# Patient Record
Sex: FEMALE | Race: WHITE | Employment: UNEMPLOYED | URBAN - METROPOLITAN AREA
[De-identification: names, ages, dates, MRNs, and addresses within clinical notes are randomized per-mention and may not be internally consistent; named-entity substitution may affect disease eponyms.]

---

## 2017-10-11 ENCOUNTER — HOSPITAL ENCOUNTER (INPATIENT)
Age: 27
LOS: 2 days | Discharge: HOME | DRG: 885 | End: 2017-10-13
Attending: EMERGENCY MEDICINE | Admitting: PSYCHIATRY & NEUROLOGY
Payer: MEDICARE

## 2017-10-11 DIAGNOSIS — R44.0 AUDITORY HALLUCINATION: Primary | ICD-10-CM

## 2017-10-11 PROBLEM — F29 PSYCHOSIS (HCC): Status: ACTIVE | Noted: 2017-10-11

## 2017-10-11 LAB
ALBUMIN SERPL-MCNC: 4.1 G/DL (ref 3.5–5)
ALBUMIN/GLOB SERPL: 1.2 {RATIO} (ref 1.1–2.2)
ALP SERPL-CCNC: 48 U/L (ref 45–117)
ALT SERPL-CCNC: 20 U/L (ref 12–78)
AMPHET UR QL SCN: NEGATIVE
ANION GAP SERPL CALC-SCNC: 8 MMOL/L (ref 5–15)
APAP SERPL-MCNC: <2 UG/ML (ref 10–30)
APPEARANCE UR: CLEAR
AST SERPL-CCNC: 15 U/L (ref 15–37)
BACTERIA URNS QL MICRO: NEGATIVE /HPF
BARBITURATES UR QL SCN: NEGATIVE
BASOPHILS # BLD: 0 K/UL (ref 0–0.1)
BASOPHILS NFR BLD: 0 % (ref 0–1)
BENZODIAZ UR QL: NEGATIVE
BILIRUB SERPL-MCNC: 0.7 MG/DL (ref 0.2–1)
BILIRUB UR QL: NEGATIVE
BUN SERPL-MCNC: 7 MG/DL (ref 6–20)
BUN/CREAT SERPL: 8 (ref 12–20)
CALCIUM SERPL-MCNC: 9.1 MG/DL (ref 8.5–10.1)
CANNABINOIDS UR QL SCN: NEGATIVE
CHLORIDE SERPL-SCNC: 105 MMOL/L (ref 97–108)
CO2 SERPL-SCNC: 26 MMOL/L (ref 21–32)
COCAINE UR QL SCN: NEGATIVE
COLOR UR: NORMAL
CREAT SERPL-MCNC: 0.9 MG/DL (ref 0.55–1.02)
DRUG SCRN COMMENT,DRGCM: NORMAL
EOSINOPHIL # BLD: 0.2 K/UL (ref 0–0.4)
EOSINOPHIL NFR BLD: 3 % (ref 0–7)
EPITH CASTS URNS QL MICRO: NORMAL /LPF
ERYTHROCYTE [DISTWIDTH] IN BLOOD BY AUTOMATED COUNT: 12.8 % (ref 11.5–14.5)
ETHANOL SERPL-MCNC: <10 MG/DL
GLOBULIN SER CALC-MCNC: 3.5 G/DL (ref 2–4)
GLUCOSE SERPL-MCNC: 90 MG/DL (ref 65–100)
GLUCOSE UR STRIP.AUTO-MCNC: NEGATIVE MG/DL
HCG UR QL: NEGATIVE
HCT VFR BLD AUTO: 36.8 % (ref 35–47)
HGB BLD-MCNC: 12.6 G/DL (ref 11.5–16)
HGB UR QL STRIP: NEGATIVE
HYALINE CASTS URNS QL MICRO: NORMAL /LPF (ref 0–5)
KETONES UR QL STRIP.AUTO: NEGATIVE MG/DL
LEUKOCYTE ESTERASE UR QL STRIP.AUTO: NEGATIVE
LYMPHOCYTES # BLD: 1.6 K/UL (ref 0.8–3.5)
LYMPHOCYTES NFR BLD: 22 % (ref 12–49)
MCH RBC QN AUTO: 30.6 PG (ref 26–34)
MCHC RBC AUTO-ENTMCNC: 34.2 G/DL (ref 30–36.5)
MCV RBC AUTO: 89.3 FL (ref 80–99)
METHADONE UR QL: NEGATIVE
MONOCYTES # BLD: 0.4 K/UL (ref 0–1)
MONOCYTES NFR BLD: 6 % (ref 5–13)
NEUTS SEG # BLD: 5 K/UL (ref 1.8–8)
NEUTS SEG NFR BLD: 69 % (ref 32–75)
NITRITE UR QL STRIP.AUTO: NEGATIVE
OPIATES UR QL: NEGATIVE
PCP UR QL: NEGATIVE
PH UR STRIP: 7.5 [PH] (ref 5–8)
PLATELET # BLD AUTO: 186 K/UL (ref 150–400)
POTASSIUM SERPL-SCNC: 3.8 MMOL/L (ref 3.5–5.1)
PROT SERPL-MCNC: 7.6 G/DL (ref 6.4–8.2)
PROT UR STRIP-MCNC: NEGATIVE MG/DL
RBC # BLD AUTO: 4.12 M/UL (ref 3.8–5.2)
RBC #/AREA URNS HPF: NORMAL /HPF (ref 0–5)
SALICYLATES SERPL-MCNC: <1.7 MG/DL (ref 2.8–20)
SODIUM SERPL-SCNC: 139 MMOL/L (ref 136–145)
SP GR UR REFRACTOMETRY: 1.01 (ref 1–1.03)
TSH SERPL DL<=0.05 MIU/L-ACNC: 1.39 UIU/ML (ref 0.36–3.74)
UR CULT HOLD, URHOLD: NORMAL
UROBILINOGEN UR QL STRIP.AUTO: 0.2 EU/DL (ref 0.2–1)
WBC # BLD AUTO: 7.3 K/UL (ref 3.6–11)
WBC URNS QL MICRO: NORMAL /HPF (ref 0–4)

## 2017-10-11 PROCEDURE — 36415 COLL VENOUS BLD VENIPUNCTURE: CPT | Performed by: EMERGENCY MEDICINE

## 2017-10-11 PROCEDURE — 80307 DRUG TEST PRSMV CHEM ANLYZR: CPT | Performed by: EMERGENCY MEDICINE

## 2017-10-11 PROCEDURE — 80053 COMPREHEN METABOLIC PANEL: CPT | Performed by: EMERGENCY MEDICINE

## 2017-10-11 PROCEDURE — 74011250637 HC RX REV CODE- 250/637: Performed by: PSYCHIATRY & NEUROLOGY

## 2017-10-11 PROCEDURE — 84443 ASSAY THYROID STIM HORMONE: CPT | Performed by: PSYCHIATRY & NEUROLOGY

## 2017-10-11 PROCEDURE — 90791 PSYCH DIAGNOSTIC EVALUATION: CPT

## 2017-10-11 PROCEDURE — 99285 EMERGENCY DEPT VISIT HI MDM: CPT

## 2017-10-11 PROCEDURE — 65220000003 HC RM SEMIPRIVATE PSYCH

## 2017-10-11 PROCEDURE — 81025 URINE PREGNANCY TEST: CPT

## 2017-10-11 PROCEDURE — 81001 URINALYSIS AUTO W/SCOPE: CPT | Performed by: EMERGENCY MEDICINE

## 2017-10-11 PROCEDURE — 85025 COMPLETE CBC W/AUTO DIFF WBC: CPT | Performed by: EMERGENCY MEDICINE

## 2017-10-11 RX ORDER — HALOPERIDOL DECANOATE 50 MG/ML
50 INJECTION INTRAMUSCULAR ONCE
COMMUNITY
End: 2017-10-11

## 2017-10-11 RX ORDER — LORAZEPAM 2 MG/ML
2 INJECTION INTRAMUSCULAR
Status: DISCONTINUED | OUTPATIENT
Start: 2017-10-11 | End: 2017-10-13 | Stop reason: HOSPADM

## 2017-10-11 RX ORDER — IBUPROFEN 200 MG
1 TABLET ORAL
Status: DISCONTINUED | OUTPATIENT
Start: 2017-10-11 | End: 2017-10-13 | Stop reason: HOSPADM

## 2017-10-11 RX ORDER — ZOLPIDEM TARTRATE 10 MG/1
10 TABLET ORAL
Status: DISCONTINUED | OUTPATIENT
Start: 2017-10-11 | End: 2017-10-11 | Stop reason: DRUGHIGH

## 2017-10-11 RX ORDER — BENZTROPINE MESYLATE 1 MG/1
1 TABLET ORAL 2 TIMES DAILY
COMMUNITY
End: 2017-10-13

## 2017-10-11 RX ORDER — ZOLPIDEM TARTRATE 5 MG/1
5 TABLET ORAL
Status: DISCONTINUED | OUTPATIENT
Start: 2017-10-11 | End: 2017-10-13 | Stop reason: HOSPADM

## 2017-10-11 RX ORDER — HALOPERIDOL DECANOATE 100 MG/ML
100 INJECTION INTRAMUSCULAR
COMMUNITY
End: 2017-10-13

## 2017-10-11 RX ORDER — OLANZAPINE 5 MG/1
5 TABLET ORAL
Status: DISCONTINUED | OUTPATIENT
Start: 2017-10-11 | End: 2017-10-13 | Stop reason: HOSPADM

## 2017-10-11 RX ORDER — ADHESIVE BANDAGE
30 BANDAGE TOPICAL DAILY PRN
Status: DISCONTINUED | OUTPATIENT
Start: 2017-10-11 | End: 2017-10-13 | Stop reason: HOSPADM

## 2017-10-11 RX ORDER — LORAZEPAM 1 MG/1
1 TABLET ORAL
Status: DISCONTINUED | OUTPATIENT
Start: 2017-10-11 | End: 2017-10-13 | Stop reason: HOSPADM

## 2017-10-11 RX ORDER — BENZTROPINE MESYLATE 2 MG/1
2 TABLET ORAL
Status: DISCONTINUED | OUTPATIENT
Start: 2017-10-11 | End: 2017-10-13 | Stop reason: HOSPADM

## 2017-10-11 RX ORDER — IBUPROFEN 400 MG/1
400 TABLET ORAL
Status: DISCONTINUED | OUTPATIENT
Start: 2017-10-11 | End: 2017-10-13 | Stop reason: HOSPADM

## 2017-10-11 RX ORDER — BENZTROPINE MESYLATE 1 MG/ML
2 INJECTION INTRAMUSCULAR; INTRAVENOUS
Status: DISCONTINUED | OUTPATIENT
Start: 2017-10-11 | End: 2017-10-13 | Stop reason: HOSPADM

## 2017-10-11 RX ORDER — HYDROXYZINE PAMOATE 50 MG/1
50 CAPSULE ORAL 3 TIMES DAILY
COMMUNITY
End: 2017-10-13

## 2017-10-11 RX ORDER — ACETAMINOPHEN 325 MG/1
650 TABLET ORAL
Status: DISCONTINUED | OUTPATIENT
Start: 2017-10-11 | End: 2017-10-13 | Stop reason: HOSPADM

## 2017-10-11 RX ADMIN — LORAZEPAM 1 MG: 1 TABLET ORAL at 19:44

## 2017-10-11 RX ADMIN — BENZTROPINE MESYLATE 2 MG: 2 TABLET ORAL at 21:28

## 2017-10-11 RX ADMIN — OLANZAPINE 5 MG: 5 TABLET, FILM COATED ORAL at 19:44

## 2017-10-11 NOTE — IP AVS SNAPSHOT
[address] 
[phone] Patient: Denisa Magana MRN: [id number] :1990 You are allergic to the following No active allergies Recent Documentation Height Weight Breastfeeding? BMI OB Status Smoking Status 1.651 m 59 kg No 21.63 kg/m2 Having regular periods Never Smoker Emergency Contacts Name Discharge Info Relation Home Work Mobile None,None DECLINED CAREGIVER [4] Unknown [9] [phone] About your hospitalization You were admitted on:  2017 You last received care in the:  [address] You were discharged on:  2017 Unit phone number:  [phone] Why you were hospitalized Your primary diagnosis was:  Psychosis Providers Seen During Your Hospitalizations Provider Role Specialty Primary office phone Daniella Garcia MD Attending Provider Emergency Medicine [phone] Chato Headley MD Attending Provider Psychiatry [phone] Your Primary Care Physician (PCP) Primary Care Physician Office Phone Office Fax NONE ** None ** ** None ** Follow-up Information Follow up With Details Comments Contact Info [address] On 10/13/2017 You have a 4:20pm appointment with Dr. Jeimy Phillips. [facility] Current Discharge Medication List  
  
START taking these medications Dose & Instructions Dispensing Information Comments Morning Noon Evening Bedtime  
 hydrOXYzine HCl 50 mg tablet Commonly known as:  ATARAX Replaces:  hydrOXYzine pamoate 50 mg capsule Your next dose is: Today at 4pm  
   
 Dose:  50 mg Take 1 Tab by mouth three (3) times daily for 10 days. Indications: anxiety Quantity:  90 Tab Refills:  0 CONTINUE these medications which have CHANGED Dose & Instructions Dispensing Information Comments Morning Noon Evening Bedtime  
 haloperidol decanoate 100 mg/mL injection Commonly known as:  HALDOL DECANOATE What changed:  how much to take Notes to Patient:  You will receive this today and the next dose will be in November Dose:  150 mg  
1.5 mL by IntraMUSCular route every twenty-eight (28) days. Indications: Schizophrenia Quantity:  1.5 mL Refills:  0 CONTINUE these medications which have NOT CHANGED Dose & Instructions Dispensing Information Comments Morning Noon Evening Bedtime  
 benztropine 1 mg tablet Commonly known as:  COGENTIN Your next dose is: Today at 9pm  
   
 Dose:  1 mg Take 1 Tab by mouth two (2) times a day. Indications: extrapyramidal disease Quantity:  60 Tab Refills:  0 STOP taking these medications   
 hydrOXYzine pamoate 50 mg capsule Commonly known as:  VISTARIL Replaced by:  hydrOXYzine HCl 50 mg tablet Where to Get Your Medications Information on where to get these meds will be given to you by the nurse or doctor. ! Ask your nurse or doctor about these medications  
  benztropine 1 mg tablet  
 haloperidol decanoate 100 mg/mL injection  
 hydrOXYzine HCl 50 mg tablet Discharge Instructions DISCHARGE SUMMARY 
 
NAME:Bonnie Marques : 1990 MRN: [id number] The patient Zhang Overcast exhibits the ability to control behavior in a less restrictive environment. Patient's level of functioning is improving. No assaultive/destructive behavior has been observed for the past 24 hours. No suicidal/homicidal threat or behavior has been observed for the past 24 hours. There is no evidence of serious medication side effects. Patient has not been in physical or protective restraints for at least the past 24 hours. If weapons involved, how are they secured? No weapons involved. Is patient aware of and in agreement with discharge plan? Yes Arrangements for medication:  Prescriptions given to patient. Copy of discharge instructions to provider?:  Ayana Roth Arrangements for transportation home:  Taxi to [place] Keep all follow up appointments as scheduled, continue to take prescribed medications per physician instructions. Mental health crisis number:  773 or your local mental health crisis line number at [phone]. DISCHARGE SUMMARY from Nurse The following personal items are in your possession at time of discharge: 
 
Dental Appliances: None Visual Aid: None Home Medications: None Jewelry: None Clothing: Footwear, Pants, Shirt, Socks, Undergarments (2tee,2pant,4undies,sox,bra,boots) Other Valuables: Cell Phone, Keys, Personal toiletries, Purse, Wallet (purse,elec tbrush,pste,shmp,aditi(clst)phone,$7+,keys,id(MR)) Personal Items Sent to Safe: cc 
 
 
 
 
PATIENT INSTRUCTIONS: 
 
What to do at Home: 
Recommended activity: Activity as tolerated, If you experience any of the following symptoms thoughts of harming self, feeling overwhelmed with anxiety and increase in auditory hallucinations, please follow up with your assigned staff. . 
 
 
*  Please give a list of your current medications to your Primary Care Provider. *  Please update this list whenever your medications are discontinued, doses are 
    changed, or new medications (including over-the-counter products) are added. *  Please carry medication information at all times in case of emergency situations. These are general instructions for a healthy lifestyle: No smoking/ No tobacco products/ Avoid exposure to second hand smoke Surgeon General's Warning:  Quitting smoking now greatly reduces serious risk to your health. Obesity, smoking, and sedentary lifestyle greatly increases your risk for illness A healthy diet, regular physical exercise & weight monitoring are important for maintaining a healthy lifestyle You may be retaining fluid if you have a history of heart failure or if you experience any of the following symptoms:  Weight gain of 3 pounds or more overnight or 5 pounds in a week, increased swelling in our hands or feet or shortness of breath while lying flat in bed. Please call your doctor as soon as you notice any of these symptoms; do not wait until your next office visit. Recognize signs and symptoms of STROKE: 
 
F-face looks uneven A-arms unable to move or move unevenly S-speech slurred or non-existent T-time-call 911 as soon as signs and symptoms begin-DO NOT go Back to bed or wait to see if you get better-TIME IS BRAIN. Warning Signs of HEART ATTACK Call 911 if you have these symptoms: 
? Chest discomfort. Most heart attacks involve discomfort in the center of the chest that lasts more than a few minutes, or that goes away and comes back. It can feel like uncomfortable pressure, squeezing, fullness, or pain. ? Discomfort in other areas of the upper body. Symptoms can include pain or discomfort in one or both arms, the back, neck, jaw, or stomach. ? Shortness of breath with or without chest discomfort. ? Other signs may include breaking out in a cold sweat, nausea, or lightheadedness. Don't wait more than five minutes to call [address]! Fast action can save your life. Calling 911 is almost always the fastest way to get lifesaving treatment. Emergency Medical Services staff can begin treatment when they arrive  up to an hour sooner than if someone gets to the hospital by car. The discharge information has been reviewed with the patient. The patient verbalized understanding. Discharge medications reviewed with the patient and appropriate educational materials and side effects teaching were provided. Discharge Orders None [facility] Announcement We are excited to announce that we are making your provider's discharge notes available to you in Vudu. You will see these notes when they are completed and signed by the physician that discharged you from your recent hospital stay. If you have any questions or concerns about any information you see in Vudu, please call the Health Information Department where you were seen or reach out to your Primary Care Provider for more information about your plan of care. Introducing [facility]! Romayne Duster introduces Vudu patient portal. Now you can access parts of your medical record, email your doctor's office, and request medication refills online. 1. In your internet browser, go to [email or web address] 2. Click on the First Time User? Click Here link in the Sign In box. You will see the New Member Sign Up page. 3. Enter your Vudu Access Code exactly as it appears below. You will not need to use this code after youve completed the sign-up process. If you do not sign up before the expiration date, you must request a new code. · Vudu Access Code: [email or web address] Expires: 1/11/2018 12:35 PM 
 
4. Enter the last four digits of your Social Security Number (xxxx) and Date of Birth (mm/dd/yyyy) as indicated and click Submit. You will be taken to the next sign-up page. 5. Create a Vudu ID. This will be your Vudu login ID and cannot be changed, so think of one that is secure and easy to remember. 6. Create a Vudu password. You can change your password at any time. 7. Enter your Password Reset Question and Answer. This can be used at a later time if you forget your password. 8. Enter your e-mail address. You will receive e-mail notification when new information is available in [address] 9. Click Sign Up. You can now view and download portions of your medical record.  
10. Click the Download Summary menu link to download a portable copy of your medical information. If you have questions, please visit the Frequently Asked Questions section of the [employer] website. Remember, MyChart is NOT to be used for urgent needs. For medical emergencies, dial 911. Now available from your iPhone and Android! General Information Please provide this summary of care documentation to your next provider. Patient Signature:  ____________________________________________________________ Date:  ____________________________________________________________  
  
Tory Hero Provider Signature:  ____________________________________________________________ Date:  ____________________________________________________________

## 2017-10-11 NOTE — IP AVS SNAPSHOT
[address] 
[phone] Patient: Prem West MRN: [id number] :1990 Current Discharge Medication List  
  
START taking these medications Dose & Instructions Dispensing Information Comments Morning Noon Evening Bedtime  
 hydrOXYzine HCl 50 mg tablet Commonly known as:  ATARAX Replaces:  hydrOXYzine pamoate 50 mg capsule Your next dose is: Today at 4pm  
   
 Dose:  50 mg Take 1 Tab by mouth three (3) times daily for 10 days. Indications: anxiety Quantity:  90 Tab Refills:  0 CONTINUE these medications which have CHANGED Dose & Instructions Dispensing Information Comments Morning Noon Evening Bedtime  
 haloperidol decanoate 100 mg/mL injection Commonly known as:  HALDOL DECANOATE What changed:  how much to take Notes to Patient:  You will receive this today and the next dose will be in November Dose:  150 mg  
1.5 mL by IntraMUSCular route every twenty-eight (28) days. Indications: Schizophrenia Quantity:  1.5 mL Refills:  0 CONTINUE these medications which have NOT CHANGED Dose & Instructions Dispensing Information Comments Morning Noon Evening Bedtime  
 benztropine 1 mg tablet Commonly known as:  COGENTIN Your next dose is: Today at 9pm  
   
 Dose:  1 mg Take 1 Tab by mouth two (2) times a day. Indications: extrapyramidal disease Quantity:  60 Tab Refills:  0 STOP taking these medications   
 hydrOXYzine pamoate 50 mg capsule Commonly known as:  VISTARIL Replaced by:  hydrOXYzine HCl 50 mg tablet Where to Get Your Medications Information on where to get these meds will be given to you by the nurse or doctor. ! Ask your nurse or doctor about these medications  
  benztropine 1 mg tablet  
 haloperidol decanoate 100 mg/mL injection hydrOXYzine HCl 50 mg tablet

## 2017-10-11 NOTE — BH NOTES
Patient admitted voluntarily to General Inpatient Psychiatry, under the services of . Patient currently denies suicidal ideation. Patient currently denies homicidal ideation. Patient verbally contracts for safety. Patient admits psychotic symptoms, including auditory and tactile hallucinations. Pt denies ETOH use. Pt denies drug use.
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
## 2017-10-11 NOTE — ED TRIAGE NOTES
TRIAGE: Pt arrives via EMS from Behavioral health clinic with c/o hearing voices that are 'saying they are going to hurt me and im scared and I dont know what to do'. Pt on haldol now and its not helping.  Voices started when she was taken off of clonipine in 2015. -SI, -HI

## 2017-10-11 NOTE — ROUTINE PROCESS
TRANSFER - OUT REPORT:    Verbal report given to Poly(name) on Ashley Merchant  being transferred to (unit) for routine progression of care       Report consisted of patients Situation, Background, Assessment and   Recommendations(SBAR). Information from the following report(s) SBAR, ED Summary, Intake/Output, MAR and Recent Results was reviewed with the receiving nurse. Lines:       Opportunity for questions and clarification was provided.       Patient transported with:   Tech and HPD

## 2017-10-11 NOTE — BH NOTES
PRN Medication Documentation    Specific patient behavior that led to need for PRN medication: anxiety and hearing voices  Staff interventions attempted prior to PRN being given: coping skills  PRN medication given: ativan and zyprexa  Patient response/effectiveness of PRN medication: will reassess    9:30 PM  PRN Medication Documentation    Specific patient behavior that led to need for PRN medication: side effect symptoms  Staff interventions attempted prior to PRN being given:   PRN medication given: cogentin  Patient response/effectiveness of PRN medication:

## 2017-10-11 NOTE — ED PROVIDER NOTES
HPI Comments: 27 y.o. female with past medical history significant for anxiety who presents via EMS from behavioral health clinic with chief complaint of auditory hallucinations. Patient complains of auditory hallucinations that have been on and off since 2015 when she was taken off Klonopin. Patient reports voices have been gradually worsening over the past 2 months since she came to [address] [place] [address]. Patient states the voices do not tell her to harm herself or others but they are threatening to harm her and she does not feel safe. Patient was seen at [facility] 3 days ago, saw psychiatry, and her Vistaril was increased. Patient has an appointment with [facility] in two days. Patient states she is compliant with her haloperidol but \"it isn't working anymore. \"  She states she has been on haloperidol for the past 6-12 months. Patient states she was diagnosed with anxiety disorder in 2013 while she was in the [employer] and was started on Raymundo Denver 13. Patient denies fever, chills, vomiting, diarrhea, appetite change, recent illness. There are no other acute medical concerns at this time. Denies chance of pregnancy. Social hx: Nonsmoker  PCP: No primary care provider on file. Note written by Hamlet Talbert, as dictated by Janes Anderson MD 2:25 PM      The history is provided by the patient. No past medical history on file. No past surgical history on file. No family history on file. Social History     Social History    Marital status: N/A     Spouse name: N/A    Number of children: N/A    Years of education: N/A     Occupational History    Not on file. Social History Main Topics    Smoking status: Not on file    Smokeless tobacco: Not on file    Alcohol use Not on file    Drug use: Not on file    Sexual activity: Not on file     Other Topics Concern    Not on file     Social History Narrative         ALLERGIES: Review of patient's allergies indicates not on file.     Review of Systems Constitutional: Negative for appetite change, chills and fever. HENT: Negative for congestion. Respiratory: Negative for cough. Gastrointestinal: Negative for constipation, diarrhea, nausea and vomiting. Psychiatric/Behavioral: Positive for hallucinations (auditory). Negative for suicidal ideas. All other systems reviewed and are negative. Vitals:    10/11/17 1428   BP: 108/72   Pulse: 86   Resp: 22   Temp: 98.4 °F (36.9 °C)   SpO2: 97%   Weight: 59 kg (130 lb)   Height: 5' 5\" (1.651 m)            Physical Exam   Constitutional: She is oriented to person, place, and time. She appears well-developed and well-nourished. Anxious, NAD, AxOx4, speaking in complete sentences     HENT:   Head: Normocephalic and atraumatic. Nose: Nose normal.   Mouth/Throat: Oropharynx is clear and moist.   Eyes: Conjunctivae and EOM are normal. Pupils are equal, round, and reactive to light. Right eye exhibits no discharge. Left eye exhibits no discharge. No scleral icterus. Neck: Normal range of motion. Neck supple. No JVD present. No tracheal deviation present. Cardiovascular: Normal rate, regular rhythm, normal heart sounds and intact distal pulses. Exam reveals no gallop and no friction rub. No murmur heard. Pulmonary/Chest: Effort normal and breath sounds normal. No respiratory distress. She has no wheezes. She has no rales. She exhibits no tenderness. Abdominal: Soft. Bowel sounds are normal. There is no tenderness. There is no rebound and no guarding. nttp     Genitourinary: No vaginal discharge found. Genitourinary Comments: Pt denies urinary/ vaginal complaints   Musculoskeletal: Normal range of motion. She exhibits no edema, tenderness or deformity. Neurological: She is alert and oriented to person, place, and time. She has normal reflexes. No cranial nerve deficit. She exhibits normal muscle tone. Coordination normal.   Skin: Skin is warm and dry. No rash noted. No erythema. No pallor. Psychiatric: Her mood appears anxious. Her affect is not angry, not blunt, not labile and not inappropriate. Her speech is not rapid and/or pressured, not delayed, not tangential and not slurred. She is actively hallucinating. She is not agitated, not aggressive, not hyperactive, not slowed, not withdrawn and not combative. Thought content is not paranoid and not delusional. Cognition and memory are not impaired. She does not express impulsivity or inappropriate judgment. She exhibits a depressed mood. She expresses no homicidal and no suicidal ideation. She expresses no suicidal plans and no homicidal plans. She is communicative. She exhibits normal recent memory and normal remote memory. She is attentive. Nursing note and vitals reviewed.        [facility]  ED Course       Procedures Pt w/ , 'the haloperidol isnt working'; will evaluate/ check labs/ have evaluated by [facility]; Pt agrees w/ these plans;     4:02 PM  Pt is medically cleared to be evaluated by psychiatry;

## 2017-10-11 NOTE — BH NOTES
TRANSFER - IN REPORT:    Verbal report received from Marci (RN) on Dana Doyle  being received from ED  for routine progression of care      Report consisted of patients Situation, Background, Assessment and   Recommendations(SBAR). Information from the following report(s) SBAR was reviewed with the receiving nurse. Opportunity for questions and clarification was provided. Assessment completed upon patients arrival to unit and care assumed.

## 2017-10-12 LAB
CHOLEST SERPL-MCNC: 124 MG/DL
GLUCOSE P FAST SERPL-MCNC: 96 MG/DL (ref 65–100)
HDLC SERPL-MCNC: 54 MG/DL
HDLC SERPL: 2.3 {RATIO} (ref 0–5)
LDLC SERPL CALC-MCNC: 53 MG/DL (ref 0–100)
LIPID PROFILE,FLP: NORMAL
TRIGL SERPL-MCNC: 85 MG/DL (ref ?–150)
VLDLC SERPL CALC-MCNC: 17 MG/DL

## 2017-10-12 PROCEDURE — 36415 COLL VENOUS BLD VENIPUNCTURE: CPT | Performed by: PSYCHIATRY & NEUROLOGY

## 2017-10-12 PROCEDURE — 74011250637 HC RX REV CODE- 250/637: Performed by: PSYCHIATRY & NEUROLOGY

## 2017-10-12 PROCEDURE — 82947 ASSAY GLUCOSE BLOOD QUANT: CPT | Performed by: PSYCHIATRY & NEUROLOGY

## 2017-10-12 PROCEDURE — 80061 LIPID PANEL: CPT | Performed by: PSYCHIATRY & NEUROLOGY

## 2017-10-12 PROCEDURE — 65220000003 HC RM SEMIPRIVATE PSYCH

## 2017-10-12 RX ORDER — HYDROXYZINE 50 MG/1
50 TABLET, FILM COATED ORAL 3 TIMES DAILY
Status: DISCONTINUED | OUTPATIENT
Start: 2017-10-12 | End: 2017-10-13 | Stop reason: HOSPADM

## 2017-10-12 RX ORDER — BENZTROPINE MESYLATE 1 MG/1
1 TABLET ORAL 2 TIMES DAILY
Status: DISCONTINUED | OUTPATIENT
Start: 2017-10-12 | End: 2017-10-13 | Stop reason: HOSPADM

## 2017-10-12 RX ORDER — HALOPERIDOL DECANOATE 100 MG/ML
150 INJECTION INTRAMUSCULAR
Status: DISCONTINUED | OUTPATIENT
Start: 2017-10-12 | End: 2017-10-13 | Stop reason: HOSPADM

## 2017-10-12 RX ADMIN — LORAZEPAM 1 MG: 1 TABLET ORAL at 22:21

## 2017-10-12 RX ADMIN — HYDROXYZINE HYDROCHLORIDE 50 MG: 50 TABLET, FILM COATED ORAL at 17:39

## 2017-10-12 RX ADMIN — OLANZAPINE 5 MG: 5 TABLET, FILM COATED ORAL at 22:12

## 2017-10-12 RX ADMIN — BENZTROPINE MESYLATE 1 MG: 1 TABLET ORAL at 17:40

## 2017-10-12 RX ADMIN — HYDROXYZINE HYDROCHLORIDE 50 MG: 50 TABLET, FILM COATED ORAL at 21:36

## 2017-10-12 NOTE — BH NOTES
PSYCHIATRIC PROGRESS NOTE         Patient Name  Dany La   Date of Birth 1990   [facility] [id number]   Medical Record Number  [id number]      Age  27 y.o. PCP None   Admit date:  10/11/2017    Room Number  727/02  @ [facility]   Date of Service  10/12/2017          PSYCHOTHERAPY SESSION NOTE:  Length of psychotherapy session: 45 minutes    Main condition/diagnosis/issues treated during session today, 10/12/2017 : psychotic symptoms reported by patient, coping skills      employed Cognitive Behavioral therapy techniques, Reality-Oriented psychotherapy, as well as supportive psychotherapy in regards to various ongoing psychosocial stressors, including the following: pre-admission and current problems; occupational issues; academic issues; medical issues; and stress of hospitalization. Interpersonal relationship issues and psychodynamic conflicts explored. Attempts made to alleviate maladaptive patterns. We, also, worked on issues of denial & effects of substance dependency/use     Overall, patient is not progressing    Treatment Plan Update (reviewed an updated 10/12/2017) : I will modify psychotherapy tx plan by implementing more stress management strategies, building upon cognitive behavioral techniques, increasing coping skills, as well as shoring up psychological defenses). An extended energy and skill set was needed to engage pt in psychotherapy due to some of the following: resistiveness, complexity, negativity, confrontational nature, hostile behaviors, and/or severe abnormalities in thought processes/psychosis resulting in the loss of expressive/receptive language communication skills. E & M PROGRESS NOTE:         HISTORY       CC:  \"AH\"  HISTORY OF PRESENT ILLNESS/INTERVAL HISTORY:  (reviewed/updated 10/12/2017). per initial evaluation:     Dany La presents/reports/evidences the following emotional symptoms today, 10/12/2017:psychosis.  The above symptoms have been present for years . These symptoms are of severe severity. The symptoms are constant  in nature. Additional symptomatology and features include anxiety. 10//11/17- Stable, medication compliant, no acute changes      SIDE EFFECTS: (reviewed/updated 10/12/2017)  None reported or admitted to. No noted toxicity with use of Depakote/Tegretol/lithium/Clozaril/TCAs   ALLERGIES:(reviewed/updated 10/12/2017)  No Known Allergies   MEDICATIONS PRIOR TO ADMISSION:(reviewed/updated 10/12/2017)  Prescriptions Prior to Admission   Medication Sig    benztropine (COGENTIN) 1 mg tablet Take 1 mg by mouth two (2) times a day.  hydrOXYzine pamoate (VISTARIL) 50 mg capsule Take 50 mg by mouth three (3) times daily.  haloperidol decanoate (HALDOL DECANOATE) 100 mg/mL injection 100 mg by IntraMUSCular route every twenty-eight (28) days. PAST MEDICAL HISTORY: Past medical history from the initial psychiatric evaluation has been reviewed (reviewed/updated 10/12/2017) with no additional updates (I asked patient and no additional past medical history provided). Past Medical History:   Diagnosis Date    Anxiety disorder     Psychosis    History reviewed. No pertinent surgical history. SOCIAL HISTORY: Social history from the initial psychiatric evaluation has been reviewed (reviewed/updated 10/12/2017) with no additional updates (I asked patient and no additional social history provided). Social History     Social History    Marital status: SINGLE     Spouse name: N/A    Number of children: N/A    Years of education: N/A     Occupational History    Not on file. Social History Main Topics    Smoking status: Never Smoker    Smokeless tobacco: Never Used    Alcohol use Yes    Drug use: No    Sexual activity: No     Other Topics Concern    Not on file     Social History Narrative    27year old  female admitted for worsening AH. Pt is on 100 mg of haldol dec.  Pt is in college graduate school. She is single, unemployed and has a twin. She has had over 10 psychiatric admissions since 2013. FAMILY HISTORY: Family history from the initial psychiatric evaluation has been reviewed (reviewed/updated 10/12/2017) with no additional updates (I asked patient and no additional family history provided). History reviewed. No pertinent family history. REVIEW OF SYSTEMS: (reviewed/updated 10/12/2017)  Appetite:no change from normal   Sleep: no change   All other Review of Systems: Psychological ROS: positive for - behavioral disorder  Respiratory ROS: no cough, shortness of breath, or wheezing  Cardiovascular ROS: no chest pain or dyspnea on exertion         [facility] (MSE):    MSE FINDINGS ARE WITHIN NORMAL LIMITS (WNL) UNLESS OTHERWISE STATED BELOW. ( ALL OF THE BELOW CATEGORIES OF THE MSE HAVE BEEN REVIEWED (reviewed 10/12/2017) AND UPDATED AS DEEMED APPROPRIATE )  General Presentation age appropriate, cooperative   Orientation oriented to time, place and person   Vital Signs  See below (reviewed 10/12/2017); Vital Signs (BP, Pulse, & Temp) are within normal limits if not listed below.    Gait and Station Stable/steady, no ataxia   Musculoskeletal System No extrapyramidal symptoms (EPS); no abnormal muscular movements or Tardive Dyskinesia (TD); muscle strength and tone are within normal limits   Language No aphasia or dysarthria   Speech:  non-pressured   Thought Processes logical; normal rate of thoughts; fair abstract reasoning/computation   Thought Associations goal directed   Thought Content not internally preoccupied   Suicidal Ideations none   Homicidal Ideations none   Mood:  stable    Affect:  mood-congruent   Memory recent  fair   Memory remote:  fair   Concentration/Attention:  hypervigilance   Fund of Knowledge average   Insight:  poor   Reliability fair   Judgment:  limited          VITALS:     Patient Vitals for the past 24 hrs:   Temp Pulse Resp BP SpO2   10/12/17 0740 98 °F (36.7 °C) 87 16 105/72 98 %   10/11/17 1923 98 °F (36.7 °C) 64 18 106/69 99 %   10/11/17 1745 97.9 °F (36.6 °C) 99 16 115/77 99 %     Wt Readings from Last 3 Encounters:   10/11/17 59 kg (130 lb)     Temp Readings from Last 3 Encounters:   10/12/17 98 °F (36.7 °C)     BP Readings from Last 3 Encounters:   10/12/17 105/72     Pulse Readings from Last 3 Encounters:   10/12/17 87            DATA     LABORATORY DATA:(reviewed/updated 10/12/2017)  Recent Results (from the past 24 hour(s))   GLUCOSE, FASTING    Collection Time: 10/12/17  7:00 AM   Result Value Ref Range    Glucose 96 65 - 100 MG/DL   LIPID PANEL    Collection Time: 10/12/17  7:00 AM   Result Value Ref Range    LIPID PROFILE          Cholesterol, total 124 <200 MG/DL    Triglyceride 85 <150 MG/DL    HDL Cholesterol 54 MG/DL    LDL, calculated 53 0 - 100 MG/DL    VLDL, calculated 17 MG/DL    CHOL/HDL Ratio 2.3 0 - 5.0       No results found for: VALF2, VALAC, VALP, VALPR, DS6, CRBAM, CRBAMP, CARB2, XCRBAM  No results found for: LITHM   RADIOLOGY REPORTS:(reviewed/updated 10/12/2017)  No results found.        MEDICATIONS     ALL MEDICATIONS:   Current Facility-Administered Medications   Medication Dose Route Frequency    haloperidol decanoate (HALDOL DECANOATE) 100 mg/mL injection 150 mg  150 mg IntraMUSCular Q28D    benztropine (COGENTIN) tablet 1 mg  1 mg Oral BID    hydrOXYzine HCl (ATARAX) tablet 50 mg  50 mg Oral TID    ziprasidone (GEODON) 20 mg in sterile water (preservative free) 1 mL injection  20 mg IntraMUSCular BID PRN    OLANZapine (ZyPREXA) tablet 5 mg  5 mg Oral Q6H PRN    benztropine (COGENTIN) tablet 2 mg  2 mg Oral BID PRN    benztropine (COGENTIN) injection 2 mg  2 mg IntraMUSCular BID PRN    LORazepam (ATIVAN) injection 2 mg  2 mg IntraMUSCular Q4H PRN    LORazepam (ATIVAN) tablet 1 mg  1 mg Oral Q4H PRN    acetaminophen (TYLENOL) tablet 650 mg  650 mg Oral Q4H PRN    ibuprofen (MOTRIN) tablet 400 mg 400 mg Oral Q8H PRN    magnesium hydroxide (MILK OF MAGNESIA) 400 mg/5 mL oral suspension 30 mL  30 mL Oral DAILY PRN    nicotine (NICODERM CQ) 21 mg/24 hr patch 1 Patch  1 Patch TransDERmal DAILY PRN    zolpidem (AMBIEN) tablet 5 mg  5 mg Oral QHS PRN      SCHEDULED MEDICATIONS:   Current Facility-Administered Medications   Medication Dose Route Frequency    haloperidol decanoate (HALDOL DECANOATE) 100 mg/mL injection 150 mg  150 mg IntraMUSCular Q28D    benztropine (COGENTIN) tablet 1 mg  1 mg Oral BID    hydrOXYzine HCl (ATARAX) tablet 50 mg  50 mg Oral TID          ASSESSMENT & PLAN     DIAGNOSES REQUIRING ACTIVE TREATMENT AND MONITORING: (reviewed/updated 10/12/2017)  Patient Active Hospital Problem List:   Psychosis (10/11/2017)    Assessment: reported auditory hallucinations with no evidence of disorganized thinking or RTIS (due likely to current dosing of haldol dec)    Plan: increase Haldol dec                In summary, Bassam Bruce, is a 27 y.o.  female who presents with a severe exacerbation of the principal diagnosis of Psychosis  Patient's condition is worsening/not improving/not stable . Patient requires continued inpatient hospitalization for further stabilization, safety monitoring and medication management. I will continue to coordinate the provision of individual, milieu, occupational, group, and substance abuse therapies to address target symptoms/diagnoses as deemed appropriate for the individual patient. A coordinated, multidisplinary treatment team round was conducted with the patient (this team consists of the nurse, psychiatric unit pharmcist,  and writer). Complete current electronic health record for patient has been reviewed today including consultant notes, ancillary staff notes, nurses and psychiatric tech notes. Suicide risk assessment completed and patient deemed to be of low risk for suicide at this time.      The following regarding medications was addressed during rounds with patient:   the risks and benefits of the proposed medication. The patient was given the opportunity to ask questions. Informed consent given to the use of the above medications. Will continue to adjust psychiatric and non-psychiatric medications (see above \"medication\" section and orders section for details) as deemed appropriate & based upon diagnoses and response to treatment. I will continue to order blood tests/labs and diagnostic tests as deemed appropriate and review results as they become available (see orders for details and above listed lab/test results). I will order psychiatric records from previous [facility] hospitals to further elucidate the nature of patient's psychopathology and review once available. I will gather additional collateral information from friends, family and o/p treatment team to further elucidate the nature of patient's psychopathology and baselline level of psychiatric functioning. I certify that this patient's inpatient psychiatric hospital services furnished since the previous certification were, and continue to be, required for treatment that could reasonably be expected to improve the patient's condition, or for diagnostic study, and that the patient continues to need, on a daily basis, active treatment furnished directly by or requiring the supervision of inpatient psychiatric facility personnel. In addition, the hospital records show that services furnished were intensive treatment services, admission or related services, or equivalent services.     EXPECTED DISCHARGE DATE/DAY: TBD     DISPOSITION: Home       Signed By:   Chris Pratt MD  10/12/2017
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
## 2017-10-12 NOTE — BH NOTES
At 1436:    Patient was back and forth about taking the Haldol Deconate. Patient refused to take the med stating, \"I am hearing voices I can not do this right now\" and left the med window. At 1450:    Patient states, \"I want to take the Haldol now\" RN advised for patient to speak to the charge nurse. RN advised that patient was too indecisive for RN to give the IM at this time.

## 2017-10-12 NOTE — BH NOTES
GROUP THERAPY PROGRESS NOTE    Milagros Deshpande participated in a Process Group with a focus identifying feelings, planning for the rest of the day, and reviewing DBT skills for managing emotional distress. Group time: 65 minutes. Personal goal for participation: To increase the capacity to improve ones mood, structure, and coping capacity. Goal orientation: The patient will be able to identify their feelings, develop a plan for structuring their day, and begin to appreciate the possibility of successfully managing distress and other forms of intense emotions. Group therapy participation: With prompting, this patient participated in the group. Therapeutic interventions reviewed and discussed: The group members were asked to introduce themselves to each other and to see if they could identify an emotion they are having and/or let the group know what they want to focus on for the day as they continue to make discharge plans. The group members also reviewed five suggested areas of DBT options when experiencing intense emotions: distraction, improve the moment, self-soothe, pros and cons, and radical acceptance. They were asked to take turns reading from the handout and discussing its contents. At the end of group the patients were provided a summary of the topics covered. Impression of participation: The patient sat through the group except for the time she was called out of the room to meet with her treatment team. She listened and seemed to be tracking the conversation of her peers. She said she had come to the hospital to have her medication adjusted, that she had been put on a medication that did not agree with her on an outpatient basis. The patient expressed no current SI/HI and displayed no overt psychotic symptoms in this group. Her affect was depressed and anxious and her mood matched her affect. This was the patient's first process group with the undersigned.

## 2017-10-12 NOTE — BH NOTES
GROUP THERAPY PROGRESS NOTE    Nely Marr is participating in reflection group. Group time: 25 minutes    Personal goal for participation: PT goal was to get help today and she came to ER. Goal orientation: personal    Group therapy participation: active    Therapeutic interventions reviewed and discussed: Unite rules and guidelines. 10 tips on how to be more greatfull.      Impression of participation: active

## 2017-10-12 NOTE — INTERDISCIPLINARY ROUNDS
Behavioral Health Interdisciplinary Rounds     Patient Name: Denisa Magana  Age: 27 y.o. Room/Bed:  727/02  Primary Diagnosis: <principal problem not specified>   Admission Status: Voluntary     Readmission within 30 days: no  Power of  in place: no  Patient requires a blocked bed: no          Reason for blocked bed: N/A    VTE Prophylaxis: Not indicated  Flu vaccine given : no   Mobility needs/Fall risk: no    Nutritional Plan: no  Consults:          Labs/Testing due today?: yes    Sleep hours: 6 hrs       Participation in Care/Groups: Yes  Medication Compliant?: Yes  PRNS (last 24 hours): Antipsychotic (PO) and Antianxiety    Restraints (last 24 hours):  no  Substance Abuse:  no  CIWA (range last 24 hours):  COWS (range last 24 hours):   Alcohol screening (AUDIT) completed -  AUDIT Score: 1  If applicable, date SBIRT discussed in treatment team AND documented: N/A  Tobacco - patient is a smoker: no   Date tobacco education completed by RN: n/a  24 hour chart check complete: yes     Patient goal(s) for today: attend all groups  Treatment team focus/goals: psychosocial  Progress note: Patient presents as guarded. Denies PTSD symptoms.      LOS:  1  Expected LOS: TBD    Financial concerns/prescription coverage: [employer] Medicare  Date of last family contact: None    Family requesting physician contact today: No  Discharge plan: Return home when stable for discharge  Guns in the home: No      Outpatient provider(s): [facility] (FAUSTO Ramírez; Dr. Jeimy Phillips)    Participating treatment team members: Denisa Magana, GINA Mckeon; Dr. Mary Banuelos MD; Jese Houston, BARBIE; Cristino Tenorio, DeniseD

## 2017-10-12 NOTE — BH NOTES
INITIAL PSYCHIATRIC EVALUATION            IDENTIFICATION:    Patient Name  Javier Mcdaniel   Date of Birth 1990   [facility] [id number]   Medical Record Number  [id number]      Age  27 y.o. PCP None   Admit date:  10/11/2017    Room Number  727/02  @ [facility]   Date of Service  10/12/2017            HISTORY         REASON FOR HOSPITALIZATION:  CC: \"AH and tactile hallucinations \". Pt admitted under a voluntary basis for severe psychosis  proving to be an imminent danger to self and an inability to care for self. HISTORY OF PRESENT ILLNESS:    The patient, Javier Mcdaniel, is a 27 y.o. WHITE [place]  female with a past psychiatric history significant for a a diagnosis of schizoaffective disorder , who presents at this time with complaints of (and/or evidence of) the following emotional symptoms: hallucinations. Additional symptomatology include anxiety. The above symptoms have been present for 4 days . These symptoms are of severe severity. These symptoms are constant  in nature. The patient's condition has been precipitated by subtherapeutic haldol dose and psychosocial stressors (graduate school  ). Patient's condition made worse by treatment noncompliance. UDS: negative; BAL=0. ALLERGIES: No Known Allergies   MEDICATIONS PRIOR TO ADMISSION:   Prescriptions Prior to Admission   Medication Sig    benztropine (COGENTIN) 1 mg tablet Take 1 mg by mouth two (2) times a day.  hydrOXYzine pamoate (VISTARIL) 50 mg capsule Take 50 mg by mouth three (3) times daily.  haloperidol decanoate (HALDOL DECANOATE) 100 mg/mL injection 100 mg by IntraMUSCular route every twenty-eight (28) days. PAST MEDICAL HISTORY:   Past Medical History:   Diagnosis Date    Anxiety disorder     Psychosis    History reviewed. No pertinent surgical history.    SOCIAL HISTORY:    Social History     Social History    Marital status: SINGLE     Spouse name: N/A    Number of children: N/A    Years of education: N/A     Occupational History    Not on file. Social History Main Topics    Smoking status: Never Smoker    Smokeless tobacco: Never Used    Alcohol use Yes    Drug use: No    Sexual activity: No     Other Topics Concern    Not on file     Social History Narrative    27year old  female admitted for worsening AH. Pt is on 100 mg of haldol dec. Pt is in college graduate school. She is single, unemployed and has a twin. She has had over 10 psychiatric admissions since 2013. FAMILY HISTORY:    History reviewed. No pertinent family history. REVIEW OF SYSTEMS:   Psychological ROS: positive for - behavioral disorder  Respiratory ROS: no cough, shortness of breath, or wheezing  Cardiovascular ROS: no chest pain or dyspnea on exertion  Pertinent items are noted in the History of Present Illness. All other Systems reviewed and are considered negative. MENTAL STATUS EXAM & VITALS     MENTAL STATUS EXAM (MSE):    MSE FINDINGS ARE WITHIN NORMAL LIMITS (WNL) UNLESS OTHERWISE STATED BELOW. ( ALL OF THE BELOW CATEGORIES OF THE MSE HAVE BEEN REVIEWED (reviewed 10/12/2017) AND UPDATED AS DEEMED APPROPRIATE )  General Presentation age appropriate, cooperative   Orientation oriented to time, place and person   Vital Signs  See below (reviewed 10/12/2017); Vital Signs (BP, Pulse, & Temp) are within normal limits if not listed below.    Gait and Station Stable/steady, no ataxia   Musculoskeletal System No extrapyramidal symptoms (EPS); no abnormal muscular movements or Tardive Dyskinesia (TD); muscle strength and tone are within normal limits   Language No aphasia or dysarthria   Speech:  hyperverbal   Thought Processes logical; normal rate of thoughts; poor abstract reasoning/computation   Thought Associations goal directed   Thought Content free of delusions and not internally preoccupied   Suicidal Ideations none   Homicidal Ideations none   Mood:  stable    Affect:  mood-congruent   Memory recent fair   Memory remote:  fair   Concentration/Attention:  hypervigilance   Fund of Knowledge average   Insight:  poor   Reliability poor   Judgment:  limited          VITALS:     Patient Vitals for the past 24 hrs:   Temp Pulse Resp BP SpO2   10/12/17 0740 98 °F (36.7 °C) 87 16 105/72 98 %   10/11/17 1923 98 °F (36.7 °C) 64 18 106/69 99 %   10/11/17 1745 97.9 °F (36.6 °C) 99 16 115/77 99 %   10/11/17 1625 98.4 °F (36.9 °C) 90 16 102/62 97 %     Wt Readings from Last 3 Encounters:   10/11/17 59 kg (130 lb)     Temp Readings from Last 3 Encounters:   10/12/17 98 °F (36.7 °C)     BP Readings from Last 3 Encounters:   10/12/17 105/72     Pulse Readings from Last 3 Encounters:   10/12/17 87            DATA     LABORATORY DATA:  Labs Reviewed   METABOLIC PANEL, COMPREHENSIVE - Abnormal; Notable for the following:        Result Value    BUN/Creatinine ratio 8 (*)     All other components within normal limits   SALICYLATE - Abnormal; Notable for the following:     Salicylate level <3.1 (*)     All other components within normal limits   ACETAMINOPHEN - Abnormal; Notable for the following:     Acetaminophen level <2 (*)     All other components within normal limits   URINE CULTURE HOLD SAMPLE   URINALYSIS W/MICROSCOPIC   DRUG SCREEN, URINE   ETHYL ALCOHOL   CBC WITH AUTOMATED DIFF   TSH 3RD GENERATION   GLUCOSE, FASTING   LIPID PANEL   SAMPLES BEING HELD   HCG URINE, QL. - POC   POC URINE PREGNANCY TEST     Admission on 10/11/2017   Component Date Value Ref Range Status    Color 10/11/2017 YELLOW/STRAW    Final    Appearance 10/11/2017 CLEAR  CLEAR   Final    Specific gravity 10/11/2017 1.008  1.003 - 1.030   Final    pH (UA) 10/11/2017 7.5  5.0 - 8.0   Final    Protein 10/11/2017 NEGATIVE   NEG mg/dL Final    Glucose 10/11/2017 NEGATIVE   NEG mg/dL Final    Ketone 10/11/2017 NEGATIVE   NEG mg/dL Final    Bilirubin 10/11/2017 NEGATIVE   NEG   Final    Blood 10/11/2017 NEGATIVE   NEG   Final    Urobilinogen 10/11/2017 0.2  0.2 - 1.0 EU/dL Final    Nitrites 10/11/2017 NEGATIVE   NEG   Final    Leukocyte Esterase 10/11/2017 NEGATIVE   NEG   Final    WBC 10/11/2017 0-4  0 - 4 /hpf Final    RBC 10/11/2017 0-5  0 - 5 /hpf Final    Epithelial cells 10/11/2017 FEW  FEW /lpf Final    Bacteria 10/11/2017 NEGATIVE   NEG /hpf Final    Hyaline cast 10/11/2017 0-2  0 - 5 /lpf Final    AMPHETAMINES 10/11/2017 NEGATIVE   NEG   Final    BARBITURATES 10/11/2017 NEGATIVE   NEG   Final    BENZODIAZEPINES 10/11/2017 NEGATIVE   NEG   Final    COCAINE 10/11/2017 NEGATIVE   NEG   Final    METHADONE 10/11/2017 NEGATIVE   NEG   Final    OPIATES 10/11/2017 NEGATIVE   NEG   Final    PCP(PHENCYCLIDINE) 10/11/2017 NEGATIVE   NEG   Final    THC (TH-CANNABINOL) 10/11/2017 NEGATIVE   NEG   Final    Drug screen comment 10/11/2017 (NOTE)   Final    ALCOHOL(ETHYL),SERUM 10/11/2017 <10  <10 MG/DL Final    Sodium 10/11/2017 139  136 - 145 mmol/L Final    Potassium 10/11/2017 3.8  3.5 - 5.1 mmol/L Final    Chloride 10/11/2017 105  97 - 108 mmol/L Final    CO2 10/11/2017 26  21 - 32 mmol/L Final    Anion gap 10/11/2017 8  5 - 15 mmol/L Final    Glucose 10/11/2017 90  65 - 100 mg/dL Final    BUN 10/11/2017 7  6 - 20 MG/DL Final    Creatinine 10/11/2017 0.90  0.55 - 1.02 MG/DL Final    BUN/Creatinine ratio 10/11/2017 8* 12 - 20   Final    GFR est AA 10/11/2017 >60  >60 ml/min/1.73m2 Final    GFR est non-AA 10/11/2017 >60  >60 ml/min/1.73m2 Final    Calcium 10/11/2017 9.1  8.5 - 10.1 MG/DL Final    Bilirubin, total 10/11/2017 0.7  0.2 - 1.0 MG/DL Final    ALT (SGPT) 10/11/2017 20  12 - 78 U/L Final    AST (SGOT) 10/11/2017 15  15 - 37 U/L Final    Alk.  phosphatase 10/11/2017 48  45 - 117 U/L Final    Protein, total 10/11/2017 7.6  6.4 - 8.2 g/dL Final    Albumin 10/11/2017 4.1  3.5 - 5.0 g/dL Final    Globulin 10/11/2017 3.5  2.0 - 4.0 g/dL Final    A-G Ratio 10/11/2017 1.2  1.1 - 2.2   Final    WBC 10/11/2017 7.3  3.6 - 11.0 K/uL Final    RBC 10/11/2017 4.12  3.80 - 5.20 M/uL Final    HGB 10/11/2017 12.6  11.5 - 16.0 g/dL Final    HCT 10/11/2017 36.8  35.0 - 47.0 % Final    MCV 10/11/2017 89.3  80.0 - 99.0 FL Final    MCH 10/11/2017 30.6  26.0 - 34.0 PG Final    MCHC 10/11/2017 34.2  30.0 - 36.5 g/dL Final    RDW 10/11/2017 12.8  11.5 - 14.5 % Final    PLATELET 06/08/7541 897  150 - 400 K/uL Final    NEUTROPHILS 10/11/2017 69  32 - 75 % Final    LYMPHOCYTES 10/11/2017 22  12 - 49 % Final    MONOCYTES 10/11/2017 6  5 - 13 % Final    EOSINOPHILS 10/11/2017 3  0 - 7 % Final    BASOPHILS 10/11/2017 0  0 - 1 % Final    ABS. NEUTROPHILS 10/11/2017 5.0  1.8 - 8.0 K/UL Final    ABS. LYMPHOCYTES 10/11/2017 1.6  0.8 - 3.5 K/UL Final    ABS. MONOCYTES 10/11/2017 0.4  0.0 - 1.0 K/UL Final    ABS. EOSINOPHILS 10/11/2017 0.2  0.0 - 0.4 K/UL Final    ABS. BASOPHILS 10/11/2017 0.0  0.0 - 0.1 K/UL Final    Salicylate level 97/46/0020 <1.7* 2.8 - 20.0 MG/DL Final    Acetaminophen level 10/11/2017 <2* 10 - 30 ug/mL Final    Urine culture hold 10/11/2017 URINE ON HOLD IN MICROBIOLOGY DEPT FOR 3 DAYS    Final    Pregnancy test,urine (POC) 10/11/2017 NEGATIVE   NEG   Final    TSH 10/11/2017 1.39  0.36 - 3.74 uIU/mL Final    Glucose 10/12/2017 96  65 - 100 MG/DL Final    LIPID PROFILE 10/12/2017        Final    Cholesterol, total 10/12/2017 124  <200 MG/DL Final    Triglyceride 10/12/2017 85  <150 MG/DL Final    HDL Cholesterol 10/12/2017 54  MG/DL Final    LDL, calculated 10/12/2017 53  0 - 100 MG/DL Final    VLDL, calculated 10/12/2017 17  MG/DL Final    CHOL/HDL Ratio 10/12/2017 2.3  0 - 5.0   Final        RADIOLOGY REPORTS:  No results found for this or any previous visit. No results found.            MEDICATIONS       ALL MEDICATIONS  Current Facility-Administered Medications   Medication Dose Route Frequency    haloperidol decanoate (HALDOL DECANOATE) 100 mg/mL injection 150 mg  150 mg IntraMUSCular Q28D    benztropine (COGENTIN) tablet 1 mg  1 mg Oral BID    hydrOXYzine HCl (ATARAX) tablet 50 mg  50 mg Oral TID    ziprasidone (GEODON) 20 mg in sterile water (preservative free) 1 mL injection  20 mg IntraMUSCular BID PRN    OLANZapine (ZyPREXA) tablet 5 mg  5 mg Oral Q6H PRN    benztropine (COGENTIN) tablet 2 mg  2 mg Oral BID PRN    benztropine (COGENTIN) injection 2 mg  2 mg IntraMUSCular BID PRN    LORazepam (ATIVAN) injection 2 mg  2 mg IntraMUSCular Q4H PRN    LORazepam (ATIVAN) tablet 1 mg  1 mg Oral Q4H PRN    acetaminophen (TYLENOL) tablet 650 mg  650 mg Oral Q4H PRN    ibuprofen (MOTRIN) tablet 400 mg  400 mg Oral Q8H PRN    magnesium hydroxide (MILK OF MAGNESIA) 400 mg/5 mL oral suspension 30 mL  30 mL Oral DAILY PRN    nicotine (NICODERM CQ) 21 mg/24 hr patch 1 Patch  1 Patch TransDERmal DAILY PRN    zolpidem (AMBIEN) tablet 5 mg  5 mg Oral QHS PRN      SCHEDULED MEDICATIONS  Current Facility-Administered Medications   Medication Dose Route Frequency    haloperidol decanoate (HALDOL DECANOATE) 100 mg/mL injection 150 mg  150 mg IntraMUSCular Q28D    benztropine (COGENTIN) tablet 1 mg  1 mg Oral BID    hydrOXYzine HCl (ATARAX) tablet 50 mg  50 mg Oral TID                ASSESSMENT & PLAN        The patient, Juan Daniel Peng, is a 27 y.o.  female who presents at this time for treatment of the following diagnoses:  Patient Active Hospital Problem List:   Psychosis (10/11/2017)    Assessment: reported AH / no evident internal preoccupation, RTIS    Plan: increase haldol dec          . A coordinated, multidisplinary treatment team (includes the nurse, unit pharmcist,  and writer) round was conducted for this initial evaluation with the patient present. The following regarding medications was addressed during rounds with patient: haldol  the risks and benefits of the proposed medication. The patient was given the opportunity to ask questions.  Informed consent given to the use of the above medications. I will continue to adjust psychiatric and non-psychiatric medications (see above \"medication\" section and orders section for details) as deemed appropriate & based upon diagnoses and response to treatment. I have reviewed admission (and previous/old) labs and medical tests in the EHR and or transferring hospital documents. I will continue to order blood tests/labs and diagnostic tests as deemed appropriate and review results as they become available (see orders for details). I have reviewed old psychiatric and medical records available in the EHR. I Will order additional psychiatric records from other institutions to further elucidate the nature of patient's psychopathology and review once available. I will gather additional collateral information from friends, family and o/p treatment team to further elucidate the nature of patient's psychopathology and baselline level of psychiatric functioning.       ESTIMATED LENGTH OF STAY:    1-3 days        STRENGTHS:  Exercising self-direction/Resourceful, Access to housing/residential stability and Interpersonal/supportive relationships (family, friends, peers)                                        SIGNED:    Elan Howell MD  10/12/2017

## 2017-10-13 VITALS
WEIGHT: 130 LBS | DIASTOLIC BLOOD PRESSURE: 74 MMHG | TEMPERATURE: 97.8 F | SYSTOLIC BLOOD PRESSURE: 107 MMHG | RESPIRATION RATE: 16 BRPM | HEART RATE: 99 BPM | HEIGHT: 65 IN | OXYGEN SATURATION: 98 % | BODY MASS INDEX: 21.66 KG/M2

## 2017-10-13 PROCEDURE — 74011250636 HC RX REV CODE- 250/636: Performed by: PSYCHIATRY & NEUROLOGY

## 2017-10-13 PROCEDURE — 74011250637 HC RX REV CODE- 250/637: Performed by: PSYCHIATRY & NEUROLOGY

## 2017-10-13 RX ORDER — BENZTROPINE MESYLATE 1 MG/1
1 TABLET ORAL 2 TIMES DAILY
Qty: 60 TAB | Refills: 0 | Status: SHIPPED | OUTPATIENT
Start: 2017-10-13

## 2017-10-13 RX ORDER — HALOPERIDOL DECANOATE 100 MG/ML
150 INJECTION INTRAMUSCULAR
Status: DISCONTINUED | OUTPATIENT
Start: 2017-10-13 | End: 2017-10-13 | Stop reason: HOSPADM

## 2017-10-13 RX ORDER — HYDROXYZINE 50 MG/1
50 TABLET, FILM COATED ORAL 3 TIMES DAILY
Qty: 90 TAB | Refills: 0 | Status: SHIPPED | OUTPATIENT
Start: 2017-10-13 | End: 2017-10-23

## 2017-10-13 RX ORDER — HALOPERIDOL DECANOATE 100 MG/ML
150 INJECTION INTRAMUSCULAR
Qty: 1.5 ML | Refills: 0 | Status: SHIPPED | OUTPATIENT
Start: 2017-10-13

## 2017-10-13 RX ADMIN — HYDROXYZINE HYDROCHLORIDE 50 MG: 50 TABLET, FILM COATED ORAL at 09:39

## 2017-10-13 RX ADMIN — BENZTROPINE MESYLATE 1 MG: 1 TABLET ORAL at 09:39

## 2017-10-13 RX ADMIN — HALOPERIDOL DECANOATE 150 MG: 100 INJECTION INTRAMUSCULAR at 13:22

## 2017-10-13 NOTE — BH NOTES
Pt reported to treatment team that she had been anxious yesterday, but was feeling much better. She reported that she had declined the Haldol Dec injection due to the anxiety yesterday, but was willing to take it today. Pt reported that she had an appointment with her psychiatrist at [facility] at 4:20pm today that she would like to attend. Pt stated she felt comfortable and safe for discharge. Pt was given her Haldol Dec injection and was discharged to a taxi cab which was instructed to take her directly to [facility] for her scheduled psychiatric appointment.

## 2017-10-13 NOTE — BH NOTES
GROUP THERAPY PROGRESS NOTE    Dany La participated in a Process Group on the General Unit with a focus identifying feelings, planning for the day, and learning more about DBT concepts of \"Elf Help\" and the importance of self-care. Group time: 60 minutes. Personal goal for participation: To identify feelings and increase the capacity to take care of oneself first as a primary coping skill. Goal orientation: The patient will be able to introduce themselves to their peers, identify their feelings, and consider the importance of taking time for ones self-care as an important part of life planning and coping skill development. Group therapy participation: With minimal prompting, this patient actively participated in the group. Therapeutic interventions reviewed and discussed: The group members were asked to begin by introducing themselves and sharing about their feelings. They were then asked to  take turns reading from an outline of twenty-two behavioral suggestions from DBT, called the [address] [place] [address], were reviewed with the group members and discussed as a group. The suggestions came with drawings of elves engaged in the activities described. These suggestions included: 1) trusting yourself (wise mind); 2) take a day off to retreat (observe and describe); 3) talk and play with children or adults (participate); 4) when you cant stop thinking, feel (non-judgmental stance); 5) stay in the present by recognizing when anxiety gets you caught up in a future or a past (one mindfully); 6) take time to think before just jumping in to solve a problem (efficiently);  7) when angry, let yourself feel the anger (the function of emotion); 8) make time for play (adult pleasant activities); 9) when sad, think about what would be comforting (accepting and engaging the opposite); 10) put yourself first (self-soothe); 11) when uncomfortable being alone, think about being with others and decide whether to make it happen (improve imagery); 12) take time to wonder (improve meaning); 13) carve out time to be lazy (improve relaxation); 14) notice the sun and the moon as they rise and set (improve one thing at a time); 15) nothing is usually the hardest thing to do  but often it is the best (guidelines for accepting reality); 16) when things are in chaos and one is in a frenzy, ask yourself [place] is right about now?  (turning your mind); 17) learn to stand back and let others take their turn as leaders (radical acceptance); 18) when someone turns you down, it usually has to do with them and not you  ask someone else for what you need (using objective effectiveness); 19) when wanting to talk with someone new and are scared, breathe  dont rehearse, just plunge and if it doesnt go well, you can stop (using relationship effectiveness); 20) when you see someone elses hurt face, breathe  you are not responsible for making other people happy (no apologies); 21) when you want something from someone else, ask  asking is being true to yourself (be truthful); 22) when you are hurt, tell the person who hurt you (situations for interpersonal effectiveness). The group members were provided a summary sheet of the DBT information discussed, which they could also review on their own time. Impression of participation: The patient said she was feeling \"OK\" and that she continues to focus on \"getting on the right medication. \" She was a little more verbal than earlier this week. She identified with a peer who had a career doing art in elementary school and she said she had done that for several months and \"really enjoyed it. \" She added that she is now working on a master's degree in [employer], which she did not seem that excited about. She said she also enjoys writing and talked about having a book published. 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  She expressed no current SI/HI and displayed no overt psychotic symptoms. She did not appear as depressed and anxious as she had earlier this week. She also needs to continue to think about her aftercare.

## 2017-10-13 NOTE — BH NOTES
Behavioral Health Transition Record to Provider    Patient Name: Bassam Bruce  YOB: 1990  Medical Record Number: [id number]  Date of Admission: 10/11/2017  Date of Discharge: 10/13/2017    Attending Provider: Claudetta Bi, MD  Discharging Provider: Claudetta Bi, MD  To contact this individual call [phone] and ask the  to page. If unavailable, ask to be transferred to [facility] Provider on call. A Behavioral Health Provider will be available on call 24/7 and during holidays     Primary Care Provider: None    No Known Allergies    H&P Summary Notes     No notes of this type exist for this encounter.           Admission Diagnosis: Psychosis    * No surgery found *    Results for orders placed or performed during the hospital encounter of 10/11/17   URINE CULTURE HOLD SAMPLE   Result Value Ref Range    Urine culture hold URINE ON HOLD IN MICROBIOLOGY DEPT FOR 3 DAYS     URINALYSIS W/MICROSCOPIC   Result Value Ref Range    Color YELLOW/STRAW      Appearance CLEAR CLEAR      Specific gravity 1.008 1.003 - 1.030      pH (UA) 7.5 5.0 - 8.0      Protein NEGATIVE  NEG mg/dL    Glucose NEGATIVE  NEG mg/dL    Ketone NEGATIVE  NEG mg/dL    Bilirubin NEGATIVE  NEG      Blood NEGATIVE  NEG      Urobilinogen 0.2 0.2 - 1.0 EU/dL    Nitrites NEGATIVE  NEG      Leukocyte Esterase NEGATIVE  NEG      WBC 0-4 0 - 4 /hpf    RBC 0-5 0 - 5 /hpf    Epithelial cells FEW FEW /lpf    Bacteria NEGATIVE  NEG /hpf    Hyaline cast 0-2 0 - 5 /lpf   DRUG SCREEN, URINE   Result Value Ref Range    AMPHETAMINES NEGATIVE  NEG      BARBITURATES NEGATIVE  NEG      BENZODIAZEPINES NEGATIVE  NEG      COCAINE NEGATIVE  NEG      METHADONE NEGATIVE  NEG      OPIATES NEGATIVE  NEG      PCP(PHENCYCLIDINE) NEGATIVE  NEG      THC (TH-CANNABINOL) NEGATIVE  NEG      Drug screen comment (NOTE)    ETHYL ALCOHOL   Result Value Ref Range    ALCOHOL(ETHYL),SERUM <10 <77 MG/DL   METABOLIC PANEL, COMPREHENSIVE   Result Value Ref Range    Sodium 139 136 - 145 mmol/L    Potassium 3.8 3.5 - 5.1 mmol/L    Chloride 105 97 - 108 mmol/L    CO2 26 21 - 32 mmol/L    Anion gap 8 5 - 15 mmol/L    Glucose 90 65 - 100 mg/dL    BUN 7 6 - 20 MG/DL    Creatinine 0.90 0.55 - 1.02 MG/DL    BUN/Creatinine ratio 8 (L) 12 - 20      GFR est AA >60 >60 ml/min/1.73m2    GFR est non-AA >60 >60 ml/min/1.73m2    Calcium 9.1 8.5 - 10.1 MG/DL    Bilirubin, total 0.7 0.2 - 1.0 MG/DL    ALT (SGPT) 20 12 - 78 U/L    AST (SGOT) 15 15 - 37 U/L    Alk. phosphatase 48 45 - 117 U/L    Protein, total 7.6 6.4 - 8.2 g/dL    Albumin 4.1 3.5 - 5.0 g/dL    Globulin 3.5 2.0 - 4.0 g/dL    A-G Ratio 1.2 1.1 - 2.2     CBC WITH AUTOMATED DIFF   Result Value Ref Range    WBC 7.3 3.6 - 11.0 K/uL    RBC 4.12 3.80 - 5.20 M/uL    HGB 12.6 11.5 - 16.0 g/dL    HCT 36.8 35.0 - 47.0 %    MCV 89.3 80.0 - 99.0 FL    MCH 30.6 26.0 - 34.0 PG    MCHC 34.2 30.0 - 36.5 g/dL    RDW 12.8 11.5 - 14.5 %    PLATELET 132 442 - 763 K/uL    NEUTROPHILS 69 32 - 75 %    LYMPHOCYTES 22 12 - 49 %    MONOCYTES 6 5 - 13 %    EOSINOPHILS 3 0 - 7 %    BASOPHILS 0 0 - 1 %    ABS. NEUTROPHILS 5.0 1.8 - 8.0 K/UL    ABS. LYMPHOCYTES 1.6 0.8 - 3.5 K/UL    ABS. MONOCYTES 0.4 0.0 - 1.0 K/UL    ABS. EOSINOPHILS 0.2 0.0 - 0.4 K/UL    ABS.  BASOPHILS 0.0 0.0 - 0.1 K/UL   SALICYLATE   Result Value Ref Range    Salicylate level <3.6 (L) 2.8 - 20.0 MG/DL   ACETAMINOPHEN   Result Value Ref Range    Acetaminophen level <2 (L) 10 - 30 ug/mL   TSH 3RD GENERATION   Result Value Ref Range    TSH 1.39 0.36 - 3.74 uIU/mL   GLUCOSE, FASTING   Result Value Ref Range    Glucose 96 65 - 100 MG/DL   LIPID PANEL   Result Value Ref Range    LIPID PROFILE          Cholesterol, total 124 <200 MG/DL    Triglyceride 85 <150 MG/DL    HDL Cholesterol 54 MG/DL    LDL, calculated 53 0 - 100 MG/DL    VLDL, calculated 17 MG/DL    CHOL/HDL Ratio 2.3 0 - 5.0     HCG URINE, QL. - POC   Result Value Ref Range    Pregnancy test,urine (POC) NEGATIVE  NEG Immunizations administered during this encounter: There is no immunization history on file for this patient. Screening for Metabolic Disorders for Patients on Antipsychotic Medications  (Data obtained from the EMR)    Estimated Body Mass Index  Estimated body mass index is 21.63 kg/(m^2) as calculated from the following:    Height as of this encounter: 5' 5\" (1.651 m). Weight as of this encounter: 59 kg (130 lb). Vital Signs/Blood Pressure  Visit Vitals    /74    Pulse 99    Temp 97.8 °F (36.6 °C)    Resp 16    Ht 5' 5\" (1.651 m)    Wt 59 kg (130 lb)    LMP 2017 (Approximate)    SpO2 98%    Breastfeeding No    BMI 21.63 kg/m2       Blood Glucose/Hemoglobin A1c  Lab Results   Component Value Date/Time    Glucose 96 10/12/2017 07:00 AM        No results found for: HBA1C, HGBE8, BIX4CRBC     Lipid Panel  Lab Results   Component Value Date/Time    Cholesterol, total 124 10/12/2017 07:00 AM    HDL Cholesterol 54 10/12/2017 07:00 AM    LDL, calculated 53 10/12/2017 07:00 AM    Triglyceride 85 10/12/2017 07:00 AM    CHOL/HDL Ratio 2.3 10/12/2017 07:00 AM       Discharge Diagnosis: Psychosis (ICD-10-CM: F29)    Discharge Plan: Patient discharged straight to [address] [place] [address] for her previously scheduled psychiatric appointment. DISCHARGE SUMMARY    NAME:Bonnie Fuller  : 1990  MRN: [id number]    The patient Andreina Murphy exhibits the ability to control behavior in a less restrictive environment. Patient's level of functioning is improving. No assaultive/destructive behavior has been observed for the past 24 hours. No suicidal/homicidal threat or behavior has been observed for the past 24 hours. There is no evidence of serious medication side effects. Patient has not been in physical or protective restraints for at least the past 24 hours. If weapons involved, how are they secured? No weapons involved. Is patient aware of and in agreement with discharge plan?  Yes    Arrangements for medication:  Prescriptions given to patient. Copy of discharge instructions to provider?:  [address] for transportation home:  Taxi to [address] all follow up appointments as scheduled, continue to take prescribed medications per physician instructions. Mental health crisis number:  634 or your local mental health crisis line number at [phone]. Discharge Medication List and Instructions:   Discharge Medication List as of 10/13/2017  2:22 PM      START taking these medications    Details   hydrOXYzine HCl (ATARAX) 50 mg tablet Take 1 Tab by mouth three (3) times daily for 10 days. Indications: anxiety, Print, Disp-90 Tab, R-0         CONTINUE these medications which have CHANGED    Details   haloperidol decanoate (HALDOL DECANOATE) 100 mg/mL injection 1.5 mL by IntraMUSCular route every twenty-eight (28) days. Indications: Schizophrenia, Print, Disp-1.5 mL, R-0      benztropine (COGENTIN) 1 mg tablet Take 1 Tab by mouth two (2) times a day. Indications: extrapyramidal disease, Print, Disp-60 Tab, R-0         STOP taking these medications       hydrOXYzine pamoate (VISTARIL) 50 mg capsule Comments:   Reason for Stopping:               Unresulted Labs     None        To obtain results of studies pending at discharge, please contact N/A. Follow-up Information     Follow up With Details Comments [address] [place] [address] On 10/13/2017 You have a 4:20pm appointment with Dr. Vahe Mello. [address] [facility]  [phone]          Advanced Directive:   Does the patient have an appointed surrogate decision maker? No  Does the patient have a Medical Advance Directive? No  Does the patient have a Psychiatric Advance Directive?  No  If the patient does not have a surrogate or Medical Advance Directive AND Psychiatric Advance Directive, the patient was offered information on these advance directives Yes and Patient declined to complete      Patient Instructions: Please continue all medications until otherwise directed by physician. What to do at Home:  Recommended activity: Activity as tolerated,     If you experience any of the following symptoms thoughts of harming self, feeling overwhelmed with anxiety and increase in auditory hallucinations, please follow up with your assigned staff. .      *  Please give a list of your current medications to your Primary Care Provider. *  Please update this list whenever your medications are discontinued, doses are      changed, or new medications (including over-the-counter products) are added. *  Please carry medication information at all times in case of emergency situations. Tobacco Cessation Discharge Plan:   Is the patient a smoker and needs referral for smoking cessation? No  Patient referred to the following for smoking cessation with an appointment? Not applicable     Patient was offered medication to assist with smoking cessation at discharge? Not applicable  Was education for smoking cessation added to the discharge instructions? Not applicable    Alcohol/Substance Abuse Discharge Plan:   Does the patient have a history of substance/alcohol abuse and requires a referral for treatment? No  Patient referred to the following for substance/alcohol abuse treatment with an appointment? Not applicable  Patient was offered medication to assist with alcohol cessation at discharge? Not applicable  Was education for substance/alcohol abuse added to discharge instructions? Not applicable    Patient discharged to Home; discussed with patient/caregiver, provided to the patient/caregiver either in hard copy or electronically. and patient refused hard copy.

## 2017-10-13 NOTE — PROGRESS NOTES
Admission Medication Reconciliation:    Information obtained from: Patient, Rx Query    Significant PMH/Disease States:   Past Medical History:   Diagnosis Date    Anxiety disorder     Psychosis        Chief Complaint for this Admission:    Chief Complaint   Patient presents with    Mental Health Problem       Allergies:  Review of patient's allergies indicates no known allergies. Prior to Admission Medications:   Prior to Admission Medications   Prescriptions Last Dose Informant Patient Reported? Taking?   benztropine (COGENTIN) 1 mg tablet 10/11/2017 at am  Yes Yes   Sig: Take 1 mg by mouth two (2) times a day.   haloperidol decanoate (HALDOL DECANOATE) 100 mg/mL injection 2017  Yes Yes   Si mg by IntraMUSCular route every twenty-eight (28) days. hydrOXYzine pamoate (VISTARIL) 50 mg capsule 10/11/2017 at am  Yes Yes   Sig: Take 50 mg by mouth three (3) times daily. Facility-Administered Medications: None         Comments/Recommendations: Reviewed PTA meds with patient who was an excellent historian.  Added above to list.
Participated in [facility] spirituality group.  Topic of discussion was Mosque, spirituality, and empowerment.        Alfa Salomon  [facility] Staff  (Rebecca Stringer Patient Care Specialist)   Paging Service [address] [place] [phone])
Problem: Altered Thought Process (Adult/Pediatric)  Goal: *STG: Decreased hallucinations  Outcome: Not Progressing Towards Goal  Visible on the unit. Mood is anxious. Pt denies SI. Pt states she is hearing voices. Continue to encourage and educate. 65: Pt's mother called to inform writer and SW pt has been in multiple hospitalization and when pt states she hears voices it is real. Pt's mother would like pt to to be prescribed klonopin or ativan when DC.
Problem: Altered Thought Process (Adult/Pediatric)  Goal: *STG: Participates in treatment plan  Outcome: Progressing Towards Goal  Out on unit passively engaged in milieu. Attending groups however noted leaves early. Able to get her needs met. Continues to decline haldol and states klonopin is only med that decreases her AH. Does not appear to be internally stimulated or preoccupied. Does not report SI or AH to nursing staff. Demonstrates ability to follow nursing direction and unit rules.
Problem: Psychosis  Goal: *STG: Remains safe in hospital  Outcome: Progressing Towards Goal  Received pt resting in bed quietly. Respirations regular, even and unlabored. Q-15 checks for safety.     - Chart review completed.
Skin Assessment performed by: LEON, RN and PM, RN    Skin is intact        Pressure Ulcer Documentation  (COMPLETE ONE LABEL PER PRESSURE ULCER)  For further information, please review corresponding Wound Care flowsheet. Kishore Beltran has:    No pressure injury noted and pressure ulcer prevention initiated.
Interventions  Outcome: Progressing Towards Goal  Staff focus is on medication education

## 2017-10-13 NOTE — DISCHARGE SUMMARY
PSYCHIATRIC DISCHARGE SUMMARY         IDENTIFICATION:    Patient Name  Nasra Purdy   Date of Birth 1990   [facility] [id number]   Medical Record Number  [id number]      Age  27 y.o. PCP None   Admit date:  10/11/2017    Discharge date: 10/13/2017   Room Number  727/02  @ [facility]   Date of Service  10/13/2017               TYPE OF DISCHARGE: REGULAR               CONDITION AT DISCHARGE: good and improved       PROVISIONAL & DISCHARGE DIAGNOSES:    Problem List  Never Reviewed          Codes Class    * (Principal)Psychosis ICD-10-CM: F29  ICD-9-CM: 298.9               Active Hospital Problems    *Psychosis        DISCHARGE DIAGNOSIS:   Axis I:  SEE ABOVE  Axis II: SEE ABOVE  Axis III: SEE ABOVE  Axis IV:  lack of structure  Axis V:  60 on admission, 70 on discharge 70(baseline)       CC & HISTORY OF PRESENT ILLNESS:  27year old  female admitted voluntarily for AH worsening. Pt has not had a recent increase in haldol dec dose. She was given a 50 mg increase and on 150 mg  im q 28 days improved. Ay discharge she denied SI/HI intent and plan. SOCIAL HISTORY:    Social History     Social History    Marital status: SINGLE     Spouse name: N/A    Number of children: N/A    Years of education: N/A     Occupational History    Not on file. Social History Main Topics    Smoking status: Never Smoker    Smokeless tobacco: Never Used    Alcohol use Yes    Drug use: No    Sexual activity: No     Other Topics Concern    Not on file     Social History Narrative    27year old  female admitted for worsening AH. Pt is on 100 mg of haldol dec. Pt is in college graduate school. She is single, unemployed and has a twin. She has had over 10 psychiatric admissions since 2013. FAMILY HISTORY:   History reviewed. No pertinent family history.           HOSPITALIZATION COURSE:    Nasra Purdy was admitted to the inpatient psychiatric unit [facility] for acute psychiatric stabilization in regards to symptomatology as described in the HPI above. The differential diagnosis at time of admission included: bipolar dis vs. schizoaffective vs. Schizophrenia. While on the unit Francois Tinsley was involved in individual, group, occupational and milieu therapy. Psychiatric medications were adjusted during this hospitalization including haldol. Francois Tinsley demonstrated a slow, but progressive improvement in overall condition. Much of patient's depression appeared to be related to situational stressors and psychological factors. Please see individual progress notes for more specific details regarding patient's hospitalization course. At time of dc, Francois Tinsley was without significant problems with depression psychosis  nicky. Overall presentation at time of discharge is most consistent with the diagnosis of psychosis Patient with request for discharge today. There are no grounds to seek a TDO. Patient has maximized benefit to be derived from acute inpatient psychiatric treatment.   All members of the treatment team concur with each other in regards to plans for discharge today per patient's request.          LABS AND IMAGAING:    Labs Reviewed   METABOLIC PANEL, COMPREHENSIVE - Abnormal; Notable for the following:        Result Value    BUN/Creatinine ratio 8 (*)     All other components within normal limits   SALICYLATE - Abnormal; Notable for the following:     Salicylate level <1.0 (*)     All other components within normal limits   ACETAMINOPHEN - Abnormal; Notable for the following:     Acetaminophen level <2 (*)     All other components within normal limits   URINE CULTURE HOLD SAMPLE   URINALYSIS W/MICROSCOPIC   DRUG SCREEN, URINE   ETHYL ALCOHOL   CBC WITH AUTOMATED DIFF   TSH 3RD GENERATION   GLUCOSE, FASTING   LIPID PANEL   SAMPLES BEING HELD   HCG URINE, QL. - POC   POC URINE PREGNANCY TEST     Admission on 10/11/2017   Component Date Value Ref Range Status    Color 10/11/2017 YELLOW/STRAW    Final    Appearance 10/11/2017 CLEAR  CLEAR   Final    Specific gravity 10/11/2017 1.008  1.003 - 1.030   Final    pH (UA) 10/11/2017 7.5  5.0 - 8.0   Final    Protein 10/11/2017 NEGATIVE   NEG mg/dL Final    Glucose 10/11/2017 NEGATIVE   NEG mg/dL Final    Ketone 10/11/2017 NEGATIVE   NEG mg/dL Final    Bilirubin 10/11/2017 NEGATIVE   NEG   Final    Blood 10/11/2017 NEGATIVE   NEG   Final    Urobilinogen 10/11/2017 0.2  0.2 - 1.0 EU/dL Final    Nitrites 10/11/2017 NEGATIVE   NEG   Final    Leukocyte Esterase 10/11/2017 NEGATIVE   NEG   Final    WBC 10/11/2017 0-4  0 - 4 /hpf Final    RBC 10/11/2017 0-5  0 - 5 /hpf Final    Epithelial cells 10/11/2017 FEW  FEW /lpf Final    Bacteria 10/11/2017 NEGATIVE   NEG /hpf Final    Hyaline cast 10/11/2017 0-2  0 - 5 /lpf Final    AMPHETAMINES 10/11/2017 NEGATIVE   NEG   Final    BARBITURATES 10/11/2017 NEGATIVE   NEG   Final    BENZODIAZEPINES 10/11/2017 NEGATIVE   NEG   Final    COCAINE 10/11/2017 NEGATIVE   NEG   Final    METHADONE 10/11/2017 NEGATIVE   NEG   Final    OPIATES 10/11/2017 NEGATIVE   NEG   Final    PCP(PHENCYCLIDINE) 10/11/2017 NEGATIVE   NEG   Final    THC (TH-CANNABINOL) 10/11/2017 NEGATIVE   NEG   Final    Drug screen comment 10/11/2017 (NOTE)   Final    ALCOHOL(ETHYL),SERUM 10/11/2017 <10  <10 MG/DL Final    Sodium 10/11/2017 139  136 - 145 mmol/L Final    Potassium 10/11/2017 3.8  3.5 - 5.1 mmol/L Final    Chloride 10/11/2017 105  97 - 108 mmol/L Final    CO2 10/11/2017 26  21 - 32 mmol/L Final    Anion gap 10/11/2017 8  5 - 15 mmol/L Final    Glucose 10/11/2017 90  65 - 100 mg/dL Final    BUN 10/11/2017 7  6 - 20 MG/DL Final    Creatinine 10/11/2017 0.90  0.55 - 1.02 MG/DL Final    BUN/Creatinine ratio 10/11/2017 8* 12 - 20   Final    GFR est AA 10/11/2017 >60  >60 ml/min/1.73m2 Final    GFR est non-AA 10/11/2017 >60  >60 ml/min/1.73m2 Final    Calcium 10/11/2017 9.1  8.5 - 10.1 MG/DL Final  Bilirubin, total 10/11/2017 0.7  0.2 - 1.0 MG/DL Final    ALT (SGPT) 10/11/2017 20  12 - 78 U/L Final    AST (SGOT) 10/11/2017 15  15 - 37 U/L Final    Alk. phosphatase 10/11/2017 48  45 - 117 U/L Final    Protein, total 10/11/2017 7.6  6.4 - 8.2 g/dL Final    Albumin 10/11/2017 4.1  3.5 - 5.0 g/dL Final    Globulin 10/11/2017 3.5  2.0 - 4.0 g/dL Final    A-G Ratio 10/11/2017 1.2  1.1 - 2.2   Final    WBC 10/11/2017 7.3  3.6 - 11.0 K/uL Final    RBC 10/11/2017 4.12  3.80 - 5.20 M/uL Final    HGB 10/11/2017 12.6  11.5 - 16.0 g/dL Final    HCT 10/11/2017 36.8  35.0 - 47.0 % Final    MCV 10/11/2017 89.3  80.0 - 99.0 FL Final    MCH 10/11/2017 30.6  26.0 - 34.0 PG Final    MCHC 10/11/2017 34.2  30.0 - 36.5 g/dL Final    RDW 10/11/2017 12.8  11.5 - 14.5 % Final    PLATELET 25/98/3222 916  150 - 400 K/uL Final    NEUTROPHILS 10/11/2017 69  32 - 75 % Final    LYMPHOCYTES 10/11/2017 22  12 - 49 % Final    MONOCYTES 10/11/2017 6  5 - 13 % Final    EOSINOPHILS 10/11/2017 3  0 - 7 % Final    BASOPHILS 10/11/2017 0  0 - 1 % Final    ABS. NEUTROPHILS 10/11/2017 5.0  1.8 - 8.0 K/UL Final    ABS. LYMPHOCYTES 10/11/2017 1.6  0.8 - 3.5 K/UL Final    ABS. MONOCYTES 10/11/2017 0.4  0.0 - 1.0 K/UL Final    ABS. EOSINOPHILS 10/11/2017 0.2  0.0 - 0.4 K/UL Final    ABS.  BASOPHILS 10/11/2017 0.0  0.0 - 0.1 K/UL Final    Salicylate level 50/85/5682 <1.7* 2.8 - 20.0 MG/DL Final    Acetaminophen level 10/11/2017 <2* 10 - 30 ug/mL Final    Urine culture hold 10/11/2017 URINE ON HOLD IN MICROBIOLOGY DEPT FOR 3 DAYS    Final    Pregnancy test,urine (POC) 10/11/2017 NEGATIVE   NEG   Final    TSH 10/11/2017 1.39  0.36 - 3.74 uIU/mL Final    Glucose 10/12/2017 96  65 - 100 MG/DL Final    LIPID PROFILE 10/12/2017        Final    Cholesterol, total 10/12/2017 124  <200 MG/DL Final    Triglyceride 10/12/2017 85  <150 MG/DL Final    HDL Cholesterol 10/12/2017 54  MG/DL Final    LDL, calculated 10/12/2017 53  0 - 100 MG/DL Final    VLDL, calculated 10/12/2017 17  MG/DL Final    CHOL/HDL Ratio 10/12/2017 2.3  0 - 5.0   Final     No results found. DISPOSITION:    Home. Patient to f/u with o/p psychiatric, and psychotherapy appointments. Patient is to f/u with internist as directed. .               FOLLOW-UP CARE:    Activity as tolerated  Regular Diet  Wound Care: none needed. Follow-up Information     Follow up With Details Comments Contact Info    None   None ([phone]) Patient stated that they have no PCP                   PROGNOSIS:  Greatly dependent upon patient's ability to f/u with o/p psychiatric/psychotherapy appointments as well as to comply with psychiatric medications as prescribed. Patient denies suicidal or homicidal ideations. [facility] fully contracts for safety. Patient reports many positive predictive factors in terms of not attempting suicide or homicide. Patient appears to be at low risk of suicide or homicide. Patient and family are aware and in agreement with discharge and discharge plan. DISCHARGE MEDICATIONS: (no changes made). Informed consent given for the use of following psychotropic medications:  Current Discharge Medication List      START taking these medications    Details   hydrOXYzine HCl (ATARAX) 50 mg tablet Take 1 Tab by mouth three (3) times daily for 10 days. Indications: anxiety  Qty: 90 Tab, Refills: 0         CONTINUE these medications which have CHANGED    Details   haloperidol decanoate (HALDOL DECANOATE) 100 mg/mL injection 1.5 mL by IntraMUSCular route every twenty-eight (28) days. Indications: Schizophrenia  Qty: 1.5 mL, Refills: 0      benztropine (COGENTIN) 1 mg tablet Take 1 Tab by mouth two (2) times a day.  Indications: extrapyramidal disease  Qty: 60 Tab, Refills: 0         STOP taking these medications       hydrOXYzine pamoate (VISTARIL) 50 mg capsule Comments:   Reason for Stopping:                      A coordinated, multidisplinary treatment team round was conducted with Hector Pendleton---this is done daily here at [facility] . This team consists of the nurse, psychiatric unit pharmcist,  and writer. I have spent greater than 35 minutes on discharge work.     Signed:  Shreyas Ríos MD  10/13/2017

## 2017-10-13 NOTE — INTERDISCIPLINARY ROUNDS
Behavioral Health Interdisciplinary Rounds     Patient Name: Navya Piper  Age: 27 y.o. Room/Bed:  727/02  Primary Diagnosis: Psychosis   Admission Status: Voluntary     Readmission within 30 days: no  Power of  in place: no  Patient requires a blocked bed: no          Reason for blocked bed: n/a    VTE Prophylaxis: Not indicated  Flu vaccine given : no   Mobility needs/Fall risk: no    Nutritional Plan: no  Consults: no         Labs/Testing due today?: no    Sleep hours: 5:15       Participation in Care/Groups:  yes  Medication Compliant?: Yes  PRNS (last 24 hours): Antipsychotic (PO) and Antianxiety    Restraints (last 24 hours):  no  Substance Abuse:  no  CIWA (range last 24 hours):    COWS (range last 24 hours):   Alcohol screening (AUDIT) completed -  AUDIT Score: 1  If applicable, date SBIRT discussed in treatment team AND documented: n/a  Tobacco - patient is a smoker: no   Date tobacco education completed by RN: n/a  24 hour chart check complete: yes     Patient goal(s) for today: Take Haldol Dec  Treatment team focus/goals: Discharge   Progress note: Patient stated she refused Haldol Dec yesterday because she was anxious, but is stating that she will take it today.      LOS:  2  Expected LOS: 2    Financial concerns/prescription coverage: [employer] Medicare  Date of last family contact: 10/12 Treatment team spoke with Mom     Family requesting physician contact today: No   Discharge plan: Return home when stable for discharge  Guns in the home: No        Outpatient provider(s): [facility]    Participating treatment team members: Terry Bran MSW; Dr. Gallo Ortega MD; Sangita Gonzalez RN; Daniel White, DeniseD

## 2017-10-13 NOTE — BH NOTES
GROUP THERAPY PROGRESS NOTE    Antonia Anaya is participating in Discharge Planning Group    Group time: 1.5 hour    Personal goal for participation: Developing Wellness Recovery  Action Plan    Goal orientation: personal    Group therapy participation: passive    Therapeutic interventions reviewed and discussed: No    Impression of participation: Pt was present in group but offered no input. Pt at times appeared to be bored ( coloring )  or mind some where else. Group facilitator and peers were unsuccessful in attempts to solicit her input.  Pt.'s mood and affect was better than on previous attendance in group

## 2017-10-13 NOTE — BH NOTES
GROUP THERAPY PROGRESS NOTE    Ashley Merchant is participating in [facility]: [facility] [address] Team Meetings    Group time: 30 minutes    Personal goal for participation: Names and Roles of Team members    Goal orientation: personal    Group therapy participation: passive    Therapeutic interventions reviewed and discussed:     Impression of participation: Pt sat quietly, seemed to be listening but not actually engaged in group. Pt.'s mood and affect was low and flat.

## 2017-10-13 NOTE — BH NOTES
1530: Patient discharged to the care [facility]. Belongings returned,  Discharge instructions reviewed with patient. Patient verbalized understanding of discharge instructions. Prescriptions given to patient. Staff walked patient to discharge. Discharge is complete.

## 2022-11-21 NOTE — IP AVS SNAPSHOT
Summary of Care Report The Summary of Care report has been created to help improve care coordination. Users with access to [employer] or [address] (Web-based application) may access additional patient information including the Discharge Summary. If you are not currently a [address] user and need more information, please call the number listed below in the [place] [address] section and ask to be connected with Medical Records. Facility Information Name Address Phone [address] [phone] Patient Information Patient Name Sex  Humaira Pineda ([id number]) Female 1990 Discharge Information Admitting Provider Service Area Unit Tressa Gold MD / [id number] / [phone] Discharge Provider Discharge Date/Time Discharge Disposition Destination (none) 10/13/2017 (Pending) AHR (none) Patient Language Language ENGLISH [13] Hospital Problems as of 10/13/2017  Never Reviewed Class Noted - Resolved Last Modified POA Active Problems * (Principal)Psychosis  10/11/2017 - Present 10/12/2017 by Fran Frankel MD Unknown Entered by Tressa Gold MD  
  
You are allergic to the following No active allergies Current Discharge Medication List  
  
START taking these medications Dose & Instructions Dispensing Information Comments  
 hydrOXYzine HCl 50 mg tablet Commonly known as:  ATARAX Replaces:  hydrOXYzine pamoate 50 mg capsule Dose:  50 mg Take 1 Tab by mouth three (3) times daily for 10 days. Indications: anxiety Quantity:  90 Tab Refills:  0 CONTINUE these medications which have CHANGED Dose & Instructions Dispensing Information Comments  
 haloperidol decanoate 100 mg/mL injection Commonly known as:  HALDOL DECANOATE What changed:  how much to take Notes to Patient:  You will receive this today and the next dose will be in November Dose:  150 mg  
1.5 mL by IntraMUSCular route every twenty-eight (28) days. Indications: Schizophrenia Quantity:  1.5 mL Refills:  0 CONTINUE these medications which have NOT CHANGED Dose & Instructions Dispensing Information Comments  
 benztropine 1 mg tablet Commonly known as:  COGENTIN Dose:  1 mg Take 1 Tab by mouth two (2) times a day. Indications: extrapyramidal disease Quantity:  60 Tab Refills:  0 STOP taking these medications Comments  
 hydrOXYzine pamoate 50 mg capsule Commonly known as:  VISTARIL Replaced by:  hydrOXYzine HCl 50 mg tablet Follow-up Information Follow up With Details Comments Contact Info Charlene SOTO On 10/13/2017 You have a 4:20pm appointment with Dr. Erik Cosby. [facility] Discharge Instructions DISCHARGE SUMMARY 
 
NAME:Bonnie Mc : 1990 MRN: [id number] The patient Alexandre Keene exhibits the ability to control behavior in a less restrictive environment. Patient's level of functioning is improving. No assaultive/destructive behavior has been observed for the past 24 hours. No suicidal/homicidal threat or behavior has been observed for the past 24 hours. There is no evidence of serious medication side effects. Patient has not been in physical or protective restraints for at least the past 24 hours. If weapons involved, how are they secured? No weapons involved. Is patient aware of and in agreement with discharge plan? Yes Arrangements for medication:  Prescriptions given to patient. Copy of discharge instructions to provider?:  [facility] Arrangements for transportation home:  Taxi to [place] Keep all follow up appointments as scheduled, continue to take prescribed medications per physician instructions. Mental health crisis number:  [phone] or your local mental health crisis line number at [phone]. DISCHARGE SUMMARY from Nurse The following personal items are in your possession at time of discharge: 
 
Dental Appliances: None Visual Aid: None Home Medications: None Jewelry: None Clothing: Footwear, Pants, Shirt, Socks, Undergarments (2tee,2pant,4undies,sox,bra,boots) Other Valuables: Cell Phone, Keys, Personal toiletries, Purse, Wallet (purse,elec tbrush,pste,shmp,aditi(clst)phone,$7+,keys,id(MR)) Personal Items Sent to Safe: cc 
 
 
 
 
PATIENT INSTRUCTIONS: 
 
What to do at Home: 
Recommended activity: Activity as tolerated, If you experience any of the following symptoms thoughts of harming self, feeling overwhelmed with anxiety and increase in auditory hallucinations, please follow up with your assigned staff. . 
 
 
*  Please give a list of your current medications to your Primary Care Provider. *  Please update this list whenever your medications are discontinued, doses are 
    changed, or new medications (including over-the-counter products) are added. *  Please carry medication information at all times in case of emergency situations. These are general instructions for a healthy lifestyle: No smoking/ No tobacco products/ Avoid exposure to second hand smoke Surgeon General's Warning:  Quitting smoking now greatly reduces serious risk to your health. Obesity, smoking, and sedentary lifestyle greatly increases your risk for illness A healthy diet, regular physical exercise & weight monitoring are important for maintaining a healthy lifestyle You may be retaining fluid if you have a history of heart failure or if you experience any of the following symptoms:  Weight gain of 3 pounds or more overnight or 5 pounds in a week, increased swelling in our hands or feet or shortness of breath while lying flat in bed. Please call your doctor as soon as you notice any of these symptoms; do not wait until your next office visit. Recognize signs and symptoms of STROKE: 
 
F-face looks uneven A-arms unable to move or move unevenly S-speech slurred or non-existent T-time-call 911 as soon as signs and symptoms begin-DO NOT go Back to bed or wait to see if you get better-TIME IS BRAIN. Warning Signs of HEART ATTACK Call 911 if you have these symptoms: 
? Chest discomfort. Most heart attacks involve discomfort in the center of the chest that lasts more than a few minutes, or that goes away and comes back. It can feel like uncomfortable pressure, squeezing, fullness, or pain. ? Discomfort in other areas of the upper body. Symptoms can include pain or discomfort in one or both arms, the back, neck, jaw, or stomach. ? Shortness of breath with or without chest discomfort. ? Other signs may include breaking out in a cold sweat, nausea, or lightheadedness. Don't wait more than five minutes to call [address]! Fast action can save your life. Calling 911 is almost always the fastest way to get lifesaving treatment. Emergency Medical Services staff can begin treatment when they arrive  up to an hour sooner than if someone gets to the hospital by car. The discharge information has been reviewed with the patient. The patient verbalized understanding. Discharge medications reviewed with the patient and appropriate educational materials and side effects teaching were provided. Chart Review Routing History No Routing History on File 4 = No assist / stand by assistance

## 2023-10-07 NOTE — DISCHARGE INSTRUCTIONS
DISCHARGE SUMMARY    NAME:Bonnie Villegas  : 1990  MRN: [id number]    The patient Rosealee Homans exhibits the ability to control behavior in a less restrictive environment. Patient's level of functioning is improving. No assaultive/destructive behavior has been observed for the past 24 hours. No suicidal/homicidal threat or behavior has been observed for the past 24 hours. There is no evidence of serious medication side effects. Patient has not been in physical or protective restraints for at least the past 24 hours. If weapons involved, how are they secured? No weapons involved. Is patient aware of and in agreement with discharge plan? Yes    Arrangements for medication:  Prescriptions given to patient. Copy of discharge instructions to provider?:  [address] for transportation home:  Taxi to [address] all follow up appointments as scheduled, continue to take prescribed medications per physician instructions. Mental health crisis number:  [phone] or your local mental health crisis line number at [phone]. DISCHARGE SUMMARY from Nurse    The following personal items are in your possession at time of discharge:    Dental Appliances: None  Visual Aid: None     Home Medications: None  Jewelry: None  Clothing: Footwear, Pants, Shirt, Socks, Undergarments (2tee,2pant,4undies,sox,bra,boots)  Other Valuables: [employer], Keys, Personal toiletries, Purse, Wallet (purse,elec tbrush,pste,shmp,aditi(clst)phone,$7+,keys,id(MR))  Personal Items Sent to Safe: cc          PATIENT INSTRUCTIONS:    What to do at Home:  Recommended activity: Activity as tolerated,     If you experience any of the following symptoms thoughts of harming self, feeling overwhelmed with anxiety and increase in auditory hallucinations, please follow up with your assigned staff. .      *  Please give a list of your current medications to your Primary Care Provider.     *  Please update this list whenever your medications are discontinued, doses are      changed, or new medications (including over-the-counter products) are added. *  Please carry medication information at all times in case of emergency situations. These are general instructions for a healthy lifestyle:    No smoking/ No tobacco products/ Avoid exposure to second hand smoke    Surgeon General's Warning:  Quitting smoking now greatly reduces serious risk to your health. Obesity, smoking, and sedentary lifestyle greatly increases your risk for illness    A healthy diet, regular physical exercise & weight monitoring are important for maintaining a healthy lifestyle    You may be retaining fluid if you have a history of heart failure or if you experience any of the following symptoms:  Weight gain of 3 pounds or more overnight or 5 pounds in a week, increased swelling in our hands or feet or shortness of breath while lying flat in bed. Please call your doctor as soon as you notice any of these symptoms; do not wait until your next office visit. Recognize signs and symptoms of STROKE:    F-face looks uneven    A-arms unable to move or move unevenly    S-speech slurred or non-existent    T-time-call 911 as soon as signs and symptoms begin-DO NOT go       Back to bed or wait to see if you get better-TIME IS BRAIN. Warning Signs of HEART ATTACK     Call 911 if you have these symptoms:   Chest discomfort. Most heart attacks involve discomfort in the center of the chest that lasts more than a few minutes, or that goes away and comes back. It can feel like uncomfortable pressure, squeezing, fullness, or pain.  Discomfort in other areas of the upper body. Symptoms can include pain or discomfort in one or both arms, the back, neck, jaw, or stomach.  Shortness of breath with or without chest discomfort.  Other signs may include breaking out in a cold sweat, nausea, or lightheadedness. Don't wait more than five minutes to call 911 - MINUTES MATTER!  Fast action can save your life. Calling 911 is almost always the fastest way to get lifesaving treatment. Emergency Medical Services staff can begin treatment when they arrive -- up to an hour sooner than if someone gets to the hospital by car. The discharge information has been reviewed with the patient. The patient verbalized understanding. Discharge medications reviewed with the patient and appropriate educational materials and side effects teaching were provided. 1 = Total assistance